# Patient Record
Sex: FEMALE | Race: WHITE | NOT HISPANIC OR LATINO | Employment: OTHER | URBAN - METROPOLITAN AREA
[De-identification: names, ages, dates, MRNs, and addresses within clinical notes are randomized per-mention and may not be internally consistent; named-entity substitution may affect disease eponyms.]

---

## 2023-05-27 ENCOUNTER — APPOINTMENT (OUTPATIENT)
Dept: CT IMAGING | Facility: HOSPITAL | Age: 83
DRG: 152 | End: 2023-05-27
Attending: EMERGENCY MEDICINE
Payer: MEDICARE

## 2023-05-27 ENCOUNTER — APPOINTMENT (OUTPATIENT)
Dept: MRI IMAGING | Facility: HOSPITAL | Age: 83
DRG: 152 | End: 2023-05-27
Attending: EMERGENCY MEDICINE
Payer: MEDICARE

## 2023-05-27 ENCOUNTER — APPOINTMENT (OUTPATIENT)
Dept: RADIOLOGY | Facility: HOSPITAL | Age: 83
DRG: 152 | End: 2023-05-27
Attending: EMERGENCY MEDICINE
Payer: MEDICARE

## 2023-05-27 ENCOUNTER — MEDICAL CORRESPONDENCE (OUTPATIENT)
Dept: HEALTH INFORMATION MANAGEMENT | Facility: CLINIC | Age: 83
End: 2023-05-27

## 2023-05-27 ENCOUNTER — HOSPITAL ENCOUNTER (INPATIENT)
Facility: HOSPITAL | Age: 83
LOS: 3 days | Discharge: NURSING, REHAB OR LONG-TERM CARE | DRG: 152 | End: 2023-05-30
Attending: EMERGENCY MEDICINE | Admitting: HOSPITALIST
Payer: MEDICARE

## 2023-05-27 DIAGNOSIS — I10 BENIGN ESSENTIAL HYPERTENSION: Primary | ICD-10-CM

## 2023-05-27 DIAGNOSIS — I25.10 ASCVD (ARTERIOSCLEROTIC CARDIOVASCULAR DISEASE): ICD-10-CM

## 2023-05-27 DIAGNOSIS — K59.00 CONSTIPATION, UNSPECIFIED CONSTIPATION TYPE: ICD-10-CM

## 2023-05-27 DIAGNOSIS — R65.10 SIRS (SYSTEMIC INFLAMMATORY RESPONSE SYNDROME) (H): ICD-10-CM

## 2023-05-27 DIAGNOSIS — R52 PAIN: ICD-10-CM

## 2023-05-27 DIAGNOSIS — J02.9 ACUTE PHARYNGITIS, UNSPECIFIED ETIOLOGY: ICD-10-CM

## 2023-05-27 DIAGNOSIS — R41.0 DELIRIUM: ICD-10-CM
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
## 2023-05-27 LAB
ABO/RH(D): NORMAL
ALBUMIN SERPL BCG-MCNC: 4.2 G/DL (ref 3.5–5.2)
ALBUMIN UR-MCNC: 30 MG/DL
ALP SERPL-CCNC: 79 U/L (ref 35–104)
ALT SERPL W P-5'-P-CCNC: 21 U/L (ref 10–35)
ANION GAP SERPL CALCULATED.3IONS-SCNC: 14 MMOL/L (ref 7–15)
ANTIBODY SCREEN: NEGATIVE
APPEARANCE UR: CLEAR
APTT PPP: 25 SECONDS (ref 22–38)
AST SERPL W P-5'-P-CCNC: 38 U/L (ref 10–35)
BASOPHILS # BLD AUTO: 0.1 10E3/UL (ref 0–0.2)
BASOPHILS NFR BLD AUTO: 0 %
BILIRUB SERPL-MCNC: 1.1 MG/DL
BILIRUB UR QL STRIP: NEGATIVE
BUN SERPL-MCNC: 22.1 MG/DL (ref 8–23)
CALCIUM SERPL-MCNC: 9.8 MG/DL (ref 8.8–10.2)
CHLORIDE SERPL-SCNC: 103 MMOL/L (ref 98–107)
COLOR UR AUTO: YELLOW
CREAT SERPL-MCNC: 0.71 MG/DL (ref 0.51–0.95)
DEPRECATED HCO3 PLAS-SCNC: 23 MMOL/L (ref 22–29)
EOSINOPHIL # BLD AUTO: 0 10E3/UL (ref 0–0.7)
EOSINOPHIL NFR BLD AUTO: 0 %
ERYTHROCYTE [DISTWIDTH] IN BLOOD BY AUTOMATED COUNT: 14.5 % (ref 10–15)
ETHANOL SERPL-MCNC: <0.01 G/DL
FLUAV RNA SPEC QL NAA+PROBE: NEGATIVE
FLUBV RNA RESP QL NAA+PROBE: NEGATIVE
GFR SERPL CREATININE-BSD FRML MDRD: 85 ML/MIN/1.73M2
GLUCOSE SERPL-MCNC: 156 MG/DL (ref 70–99)
GLUCOSE UR STRIP-MCNC: NEGATIVE MG/DL
HCT VFR BLD AUTO: 51.9 % (ref 35–47)
HGB BLD-MCNC: 17.6 G/DL (ref 11.7–15.7)
HGB UR QL STRIP: NEGATIVE
HYALINE CASTS: 26 /LPF
IMM GRANULOCYTES # BLD: 0.1 10E3/UL
IMM GRANULOCYTES NFR BLD: 0 %
INR PPP: 1.1 (ref 0.85–1.15)
KETONES UR STRIP-MCNC: 10 MG/DL
LACTATE SERPL-SCNC: 2.2 MMOL/L (ref 0.7–2)
LACTATE SERPL-SCNC: 2.3 MMOL/L (ref 0.7–2)
LEUKOCYTE ESTERASE UR QL STRIP: ABNORMAL
LIPASE SERPL-CCNC: 17 U/L (ref 13–60)
LYMPHOCYTES # BLD AUTO: 0.6 10E3/UL (ref 0.8–5.3)
LYMPHOCYTES NFR BLD AUTO: 3 %
MAGNESIUM SERPL-MCNC: 1.8 MG/DL (ref 1.7–2.3)
MCH RBC QN AUTO: 30.6 PG (ref 26.5–33)
MCHC RBC AUTO-ENTMCNC: 33.9 G/DL (ref 31.5–36.5)
MCV RBC AUTO: 90 FL (ref 78–100)
MONOCYTES # BLD AUTO: 0.7 10E3/UL (ref 0–1.3)
MONOCYTES NFR BLD AUTO: 4 %
MUCOUS THREADS #/AREA URNS LPF: PRESENT /LPF
NEUTROPHILS # BLD AUTO: 15.8 10E3/UL (ref 1.6–8.3)
NEUTROPHILS NFR BLD AUTO: 93 %
NITRATE UR QL: NEGATIVE
NRBC # BLD AUTO: 0 10E3/UL
NRBC BLD AUTO-RTO: 0 /100
PH UR STRIP: 5.5 [PH] (ref 5–7)
PLATELET # BLD AUTO: 215 10E3/UL (ref 150–450)
POTASSIUM SERPL-SCNC: 3.6 MMOL/L (ref 3.4–5.3)
PROT SERPL-MCNC: 7.1 G/DL (ref 6.4–8.3)
RBC # BLD AUTO: 5.75 10E6/UL (ref 3.8–5.2)
RBC URINE: 1 /HPF
RSV RNA SPEC NAA+PROBE: NEGATIVE
SARS-COV-2 RNA RESP QL NAA+PROBE: NEGATIVE
SODIUM SERPL-SCNC: 140 MMOL/L (ref 136–145)
SP GR UR STRIP: 1.03 (ref 1–1.03)
SPECIMEN EXPIRATION DATE: NORMAL
SQUAMOUS EPITHELIAL: <1 /HPF
TRANSITIONAL EPI: <1 /HPF
TROPONIN T SERPL HS-MCNC: 19 NG/L
TSH SERPL DL<=0.005 MIU/L-ACNC: 3.41 UIU/ML (ref 0.3–4.2)
UROBILINOGEN UR STRIP-MCNC: 2 MG/DL
WBC # BLD AUTO: 17.2 10E3/UL (ref 4–11)
WBC URINE: 4 /HPF

## 2023-05-27 PROCEDURE — 85730 THROMBOPLASTIN TIME PARTIAL: CPT | Performed by: EMERGENCY MEDICINE

## 2023-05-27 PROCEDURE — 99285 EMERGENCY DEPT VISIT HI MDM: CPT | Mod: 25

## 2023-05-27 PROCEDURE — 250N000011 HC RX IP 250 OP 636: Performed by: EMERGENCY MEDICINE

## 2023-05-27 PROCEDURE — 250N000013 HC RX MED GY IP 250 OP 250 PS 637: Performed by: HOSPITALIST

## 2023-05-27 PROCEDURE — 258N000003 HC RX IP 258 OP 636: Performed by: EMERGENCY MEDICINE

## 2023-05-27 PROCEDURE — 87637 SARSCOV2&INF A&B&RSV AMP PRB: CPT | Performed by: EMERGENCY MEDICINE

## 2023-05-27 PROCEDURE — 85610 PROTHROMBIN TIME: CPT | Performed by: EMERGENCY MEDICINE

## 2023-05-27 PROCEDURE — 86850 RBC ANTIBODY SCREEN: CPT | Performed by: EMERGENCY MEDICINE

## 2023-05-27 PROCEDURE — 99207 PR NO BILLABLE SERVICE THIS VISIT: CPT | Performed by: NURSE PRACTITIONER

## 2023-05-27 PROCEDURE — 71250 CT THORAX DX C-: CPT | Mod: MA

## 2023-05-27 PROCEDURE — 120N000001 HC R&B MED SURG/OB

## 2023-05-27 PROCEDURE — 258N000003 HC RX IP 258 OP 636: Performed by: HOSPITALIST

## 2023-05-27 PROCEDURE — 80053 COMPREHEN METABOLIC PANEL: CPT | Performed by: EMERGENCY MEDICINE

## 2023-05-27 PROCEDURE — 81001 URINALYSIS AUTO W/SCOPE: CPT | Performed by: EMERGENCY MEDICINE

## 2023-05-27 PROCEDURE — G1010 CDSM STANSON: HCPCS

## 2023-05-27 PROCEDURE — A9585 GADOBUTROL INJECTION: HCPCS | Performed by: EMERGENCY MEDICINE

## 2023-05-27 PROCEDURE — 70553 MRI BRAIN STEM W/O & W/DYE: CPT | Mod: MG

## 2023-05-27 PROCEDURE — 96361 HYDRATE IV INFUSION ADD-ON: CPT

## 2023-05-27 PROCEDURE — 83690 ASSAY OF LIPASE: CPT | Performed by: EMERGENCY MEDICINE

## 2023-05-27 PROCEDURE — 85014 HEMATOCRIT: CPT | Performed by: EMERGENCY MEDICINE

## 2023-05-27 PROCEDURE — 82077 ASSAY SPEC XCP UR&BREATH IA: CPT | Performed by: EMERGENCY MEDICINE

## 2023-05-27 PROCEDURE — 83735 ASSAY OF MAGNESIUM: CPT | Performed by: EMERGENCY MEDICINE

## 2023-05-27 PROCEDURE — 99222 1ST HOSP IP/OBS MODERATE 55: CPT | Performed by: HOSPITALIST

## 2023-05-27 PROCEDURE — 87040 BLOOD CULTURE FOR BACTERIA: CPT | Performed by: EMERGENCY MEDICINE

## 2023-05-27 PROCEDURE — 84484 ASSAY OF TROPONIN QUANT: CPT | Performed by: EMERGENCY MEDICINE

## 2023-05-27 PROCEDURE — 93005 ELECTROCARDIOGRAM TRACING: CPT | Performed by: EMERGENCY MEDICINE

## 2023-05-27 PROCEDURE — C9803 HOPD COVID-19 SPEC COLLECT: HCPCS

## 2023-05-27 PROCEDURE — 36415 COLL VENOUS BLD VENIPUNCTURE: CPT | Performed by: EMERGENCY MEDICINE

## 2023-05-27 PROCEDURE — 250N000011 HC RX IP 250 OP 636: Performed by: HOSPITALIST

## 2023-05-27 PROCEDURE — 70549 MR ANGIOGRAPH NECK W/O&W/DYE: CPT | Mod: MG

## 2023-05-27 PROCEDURE — 96374 THER/PROPH/DIAG INJ IV PUSH: CPT

## 2023-05-27 PROCEDURE — 72125 CT NECK SPINE W/O DYE: CPT | Mod: MA

## 2023-05-27 PROCEDURE — 83605 ASSAY OF LACTIC ACID: CPT | Performed by: EMERGENCY MEDICINE

## 2023-05-27 PROCEDURE — 73560 X-RAY EXAM OF KNEE 1 OR 2: CPT | Mod: 50

## 2023-05-27 PROCEDURE — 84443 ASSAY THYROID STIM HORMONE: CPT | Performed by: EMERGENCY MEDICINE

## 2023-05-27 PROCEDURE — 70450 CT HEAD/BRAIN W/O DYE: CPT | Mod: MA

## 2023-05-27 PROCEDURE — 255N000002 HC RX 255 OP 636: Performed by: EMERGENCY MEDICINE

## 2023-05-27 RX ORDER — ONDANSETRON 4 MG/1
4 TABLET, ORALLY DISINTEGRATING ORAL EVERY 6 HOURS PRN
Status: DISCONTINUED | OUTPATIENT
Start: 2023-05-27 | End: 2023-05-30 | Stop reason: HOSPADM

## 2023-05-27 RX ORDER — LIDOCAINE 40 MG/G
CREAM TOPICAL
Status: DISCONTINUED | OUTPATIENT
Start: 2023-05-27 | End: 2023-05-30 | Stop reason: HOSPADM

## 2023-05-27 RX ORDER — ENOXAPARIN SODIUM 100 MG/ML
40 INJECTION SUBCUTANEOUS EVERY EVENING
Status: DISCONTINUED | OUTPATIENT
Start: 2023-05-27 | End: 2023-05-30 | Stop reason: HOSPADM

## 2023-05-27 RX ORDER — LANOLIN ALCOHOL/MO/W.PET/CERES
3 CREAM (GRAM) TOPICAL
Status: DISCONTINUED | OUTPATIENT
Start: 2023-05-27 | End: 2023-05-30 | Stop reason: HOSPADM

## 2023-05-27 RX ORDER — SENNOSIDES 8.6 MG
1 TABLET ORAL 2 TIMES DAILY
Status: DISCONTINUED | OUTPATIENT
Start: 2023-05-27 | End: 2023-05-30 | Stop reason: HOSPADM

## 2023-05-27 RX ORDER — HYDRALAZINE HYDROCHLORIDE 20 MG/ML
10 INJECTION INTRAMUSCULAR; INTRAVENOUS EVERY 4 HOURS PRN
Status: DISCONTINUED | OUTPATIENT
Start: 2023-05-27 | End: 2023-05-30 | Stop reason: HOSPADM

## 2023-05-27 RX ORDER — LOSARTAN POTASSIUM 50 MG/1
50 TABLET ORAL AT BEDTIME
Status: DISCONTINUED | OUTPATIENT
Start: 2023-05-27 | End: 2023-05-28

## 2023-05-27 RX ORDER — MULTIPLE VITAMINS W/ MINERALS TAB 9MG-400MCG
1 TAB ORAL DAILY
COMMUNITY

## 2023-05-27 RX ORDER — AMOXICILLIN 250 MG
1 CAPSULE ORAL 2 TIMES DAILY PRN
Status: DISCONTINUED | OUTPATIENT
Start: 2023-05-27 | End: 2023-05-30 | Stop reason: HOSPADM

## 2023-05-27 RX ORDER — SENNOSIDES 8.6 MG
1 TABLET ORAL 2 TIMES DAILY
Status: ON HOLD | COMMUNITY
End: 2023-05-30

## 2023-05-27 RX ORDER — GADOBUTROL 604.72 MG/ML
7 INJECTION INTRAVENOUS ONCE
Status: COMPLETED | OUTPATIENT
Start: 2023-05-27 | End: 2023-05-27

## 2023-05-27 RX ORDER — NAPROXEN SODIUM 220 MG
220 TABLET ORAL 2 TIMES DAILY WITH MEALS
Status: ON HOLD | COMMUNITY
End: 2023-05-30

## 2023-05-27 RX ORDER — LOSARTAN POTASSIUM 50 MG/1
50 TABLET ORAL DAILY
Status: ON HOLD | COMMUNITY
End: 2023-05-30

## 2023-05-27 RX ORDER — SODIUM CHLORIDE 9 MG/ML
INJECTION, SOLUTION INTRAVENOUS CONTINUOUS
Status: DISCONTINUED | OUTPATIENT
Start: 2023-05-27 | End: 2023-05-29

## 2023-05-27 RX ORDER — ONDANSETRON 2 MG/ML
4 INJECTION INTRAMUSCULAR; INTRAVENOUS ONCE
Status: COMPLETED | OUTPATIENT
Start: 2023-05-27 | End: 2023-05-27

## 2023-05-27 RX ORDER — ONDANSETRON 2 MG/ML
4 INJECTION INTRAMUSCULAR; INTRAVENOUS EVERY 6 HOURS PRN
Status: DISCONTINUED | OUTPATIENT
Start: 2023-05-27 | End: 2023-05-30 | Stop reason: HOSPADM

## 2023-05-27 RX ORDER — AMOXICILLIN 250 MG
2 CAPSULE ORAL 2 TIMES DAILY PRN
Status: DISCONTINUED | OUTPATIENT
Start: 2023-05-27 | End: 2023-05-30 | Stop reason: HOSPADM

## 2023-05-27 RX ADMIN — GADOBUTROL 7 ML: 604.72 INJECTION INTRAVENOUS at 17:43

## 2023-05-27 RX ADMIN — SENNOSIDES 1 TABLET: 8.6 TABLET, FILM COATED ORAL at 20:27

## 2023-05-27 RX ADMIN — Medication 3 MG: at 20:26

## 2023-05-27 RX ADMIN — SODIUM CHLORIDE 1000 ML: 9 INJECTION, SOLUTION INTRAVENOUS at 14:11

## 2023-05-27 RX ADMIN — ONDANSETRON 4 MG: 2 INJECTION INTRAMUSCULAR; INTRAVENOUS at 14:20

## 2023-05-27 RX ADMIN — ENOXAPARIN SODIUM 40 MG: 40 INJECTION SUBCUTANEOUS at 20:26

## 2023-05-27 RX ADMIN — SERTRALINE HYDROCHLORIDE 50 MG: 50 TABLET ORAL at 20:27

## 2023-05-27 RX ADMIN — LOSARTAN POTASSIUM 50 MG: 50 TABLET, FILM COATED ORAL at 20:27

## 2023-05-27 RX ADMIN — SODIUM CHLORIDE: 9 INJECTION, SOLUTION INTRAVENOUS at 18:14

## 2023-05-27 ASSESSMENT — ACTIVITIES OF DAILY LIVING (ADL)
FALL_HISTORY_WITHIN_LAST_SIX_MONTHS: YES
WALKING_OR_CLIMBING_STAIRS: AMBULATION DIFFICULTY, DEPENDENT;STAIR CLIMBING DIFFICULTY, DEPENDENT;TRANSFERRING DIFFICULTY, DEPENDENT
TOILETING: 2-->COMPLETELY DEPENDENT
DOING_ERRANDS_INDEPENDENTLY_DIFFICULTY: YES
ADLS_ACUITY_SCORE: 57
TRANSFERRING: 2-->COMPLETELY DEPENDENT (NOT DEVELOPMENTALLY APPROPRIATE)
TRANSFERRING: 2-->COMPLETELY DEPENDENT
TOILETING_ASSISTANCE: TOILETING DIFFICULTY, DEPENDENT
CHANGE_IN_FUNCTIONAL_STATUS_SINCE_ONSET_OF_CURRENT_ILLNESS/INJURY: YES
ADLS_ACUITY_SCORE: 35
ADLS_ACUITY_SCORE: 37
DRESSING/BATHING_DIFFICULTY: YES
ADLS_ACUITY_SCORE: 35
EQUIPMENT_CURRENTLY_USED_AT_HOME: OTHER (SEE COMMENTS)
DRESS: 2-->COMPLETELY DEPENDENT (NOT DEVELOPMENTALLY APPROPRIATE)
CONCENTRATING,_REMEMBERING_OR_MAKING_DECISIONS_DIFFICULTY: YES
WEAR_GLASSES_OR_BLIND: OTHER (SEE COMMENTS)
TOILETING_ISSUES: YES
BATHING: 2-->COMPLETELY DEPENDENT (NOT DEVELOPMENTALLY APPROPRIATE)
ADLS_ACUITY_SCORE: 57
TOILETING: 2-->COMPLETELY DEPENDENT (NOT DEVELOPMENTALLY APPROPRIATE)
DRESSING/BATHING: BATHING DIFFICULTY, DEPENDENT;DRESSING DIFFICULTY, DEPENDENT
ADLS_ACUITY_SCORE: 35
DIFFICULTY_EATING/SWALLOWING: NO
DRESS: 2-->COMPLETELY DEPENDENT
NUMBER_OF_TIMES_PATIENT_HAS_FALLEN_WITHIN_LAST_SIX_MONTHS: 1
WALKING_OR_CLIMBING_STAIRS_DIFFICULTY: YES

## 2023-05-27 NOTE — H&P
[facility]    History and Physical - Hospitalist Service       Date of Admission:  5/27/2023    Assessment & Plan      Molly Limon is a 82 year old female admitted on 5/27/2023. She has PMH of dementia, hypertension who presented form assisted living with b/l leg weakness.  Labs noted to have tachycardia with hypoxia and tachypnea, mild leukocytosis, lactic acidosis suggestive of SIRS with no primary source of infection.  UA not suggestive of UTI.  CT chest abdomen pelvis no source of infection.  CT head and CT C-spine unremarkable.  MRI limited but neg for stroke.     SIRS  -Patient has a history of dementia and not  reliable historian.  -Labs reveal leukocytosis, tachycardia and tachypnea on arrival with mild lactic acidosis. Pt gets agitated during vitals check.  -CT chest no evidence of any pneumonia.  UA negative for UTI.  COVID, influenza AMB, RSV negative  -Monitor for now off antibiotics as unclear if patient has true underlying infection    Lactic acidosis -  -Hydrate, recheck  -Monitor off abx.      Bilateral lower extremity weakness  -Per EMS report  CT head and CT C-spine without any acute process.  -MRI/MRA head and neck limited due to motion, but negative for any ischemia.  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  -Place on fall precaution  -PT/OT consulted    Hypertension-  -Resume PTA losartan  -Hydralazine as needed    Mood disorders-stable  -Continue PTA Zoloft    Dementia   -Pt likely will benefit placement in memory care.  -Place on 1:1 for safety.       Diet: Combination Diet Regular Diet Adult  DVT Prophylaxis: Enoxaparin (Lovenox) SQ  Ordoñez Catheter: Not present  Lines: None     Cardiac Monitoring: None  Code Status: No CPR- Do NOT Intubate, per her advance directive.    Clinically Significant Risk Factors Present on Admission                  # Hypertension: Home medication list includes antihypertensive(s)               Disposition Plan      Expected Discharge  Date: 05/29/2023                  Siobhan Johnson MD  Hospitalist Service  [facility]  Securely message with [employer] (more info)  Text page via [facility] Paging/Directory     ______________________________________________________________________    Chief Complaint   Weakness    History is obtained from the  ED attending and chart review    History of Present Illness   Molly Limon is a 82 year old female with past medical history of hypertension, dementia who presented today from assisted living with bilateral lower extremity weakness.  Per EMS report patient has been having lower extremity weakness since yesterday.  Reports that she fell off today and was found by the staff during which patient was also noted to have some slight facial droop. Denies any chest pain, shortness of breath, abdominal pain, nausea, vomiting, cough, diarrhea,  dysuria or polyuria.  Constipation+.   No tingling or numbness.     Per EMS, patient was noted to be hypertensive with systolic in 190s when they arrived.  On arrival to ED patient was still hypertensive.  Labs revealed mild leukocytosis, lactic acidosis, mildly elevated troponins.  UA not suggestive of UTI.  COVID, influenza A, influenza B and RSV negative.  CT head, CT C-spine, CT chest abdomen pelvis are all unremarkable.  Given there could be concerns for stroke, this was discussed with on-call stroke neurologist who recommended MRI.  MRI pneg for ischemia.        Past Medical History    Dementia    Past Surgical History   History reviewed. No pertinent surgical history.    Prior to Admission Medications   Prior to Admission Medications   Prescriptions Last Dose Informant Patient Reported? Taking?   losartan (COZAAR) 50 MG tablet 5/26/2023 at qhs  Yes Yes   Sig: Take 50 mg by mouth daily   melatonin 5 MG tablet 5/26/2023 at qhs  Yes Yes   Sig: Take 5 mg by mouth At Bedtime   multivitamin w/minerals (THERA-VIT-M) tablet 5/27/2023 at am  Yes Yes   Sig: Take 1  tablet by mouth daily   naproxen sodium (ANAPROX) 220 MG tablet 5/27/2023 at 10am  Yes Yes   Sig: Take 220 mg by mouth 2 times daily (with meals)   sennosides (SENOKOT) 8.6 MG tablet 5/27/2023 at 10am  Yes Yes   Sig: Take 1 tablet by mouth 2 times daily   sertraline (ZOLOFT) 50 MG tablet 5/26/2023 at 8pm  Yes Yes   Sig: Take 50 mg by mouth daily      Facility-Administered Medications: None        Review of Systems    The 10 point Review of Systems is negative other than noted in the HPI or here.      Physical Exam   Vital Signs: Temp: 98  F (36.7  C) Temp src: Oral BP: (!) 211/98 Pulse: 100   Resp: 22 SpO2: 94 % O2 Device: None (Room air) Oxygen Delivery: 2 LPM  Weight: 172 lbs 13.45 oz    General: Elderly female, pleasant, NAD  HEENT:EOMI, AT,NC  CVS:RRR, no edema  RS:CTAB  Abd: Soft, NT,ND  Neurology:Awake, alert, oriented to self only, memory issues+  Psy:Approrpiate affect      Medical Decision Making       60 MINUTES SPENT BY ME on the date of service doing chart review, history, exam, documentation & further activities per the note.        Plan discussed with her niece , Neelima.  Data     I have personally reviewed the following data over the past 24 hrs:    17.2 (H)  \   17.6 (H)   / 215     140 103 22.1 /  156 (H)   3.6 23 0.71 \       ALT: 21 AST: 38 (H) AP: 79 TBILI: 1.1   ALB: 4.2 TOT PROTEIN: 7.1 LIPASE: 17       Trop: 19 (H) BNP: N/A       TSH: 3.41 T4: N/A A1C: N/A       Procal: N/A CRP: N/A Lactic Acid: 2.3 (H)       INR:  1.10 PTT:  25   D-dimer:  N/A Fibrinogen:  N/A

## 2023-05-27 NOTE — ED NOTES
[facility] ED Handoff Report    ED Chief Complaint: found down    ED Diagnosis:  (W19.XXXA) Fall, initial encounter  Comment:   Plan: neuro/obs    (R41.0) Delirium  Comment:   Plan: watch pt    (R65.10) SIRS (systemic inflammatory response syndrome) (H)  Comment:   Plan: meds, fluids       PMH:  History reviewed. No pertinent past medical history.     Code Status:  No Order     Falls Risk: Yes Band: Applied    Current Living Situation/Residence: lives in an extended care facility     Elimination Status: Continent: No     Activity Level: 2 assist    Patients Preferred Language:  English     Needed: No    Vital Signs:  BP (!) 189/92   Pulse 101   Resp 26   SpO2 94%      Cardiac Rhythm: nsr    Pain Score: 1/10    Is the Patient Confused:  Yes    Last Food or Drink: 05/27/23 at     Focused Assessment:      Tests Performed: Done: Labs and Imaging    Treatments Provided:  meds, fluids    Family Dynamics/Concerns: No    Family Updated On Visitor Policy: Yes    Plan of Care Communicated to Family: No    Who Was Updated about Plan of Care: facility    Belongings Checklist Done and Signed by Patient: No    Medications sent with patient:     Covid: asymptomatic     Additional Information:     RN: Monalisa Almeida RN   5/27/2023 4:24 PM

## 2023-05-27 NOTE — ED NOTES
Bed: JNEDH-J  Expected date:   Expected time:   Means of arrival:   Comments:  Stanislav Ritter y F Stroke symptoms

## 2023-05-27 NOTE — PHARMACY-ADMISSION MEDICATION HISTORY
Pharmacist Admission Medication History    Admission medication history is complete. The information provided in this note is only as accurate as the sources available at the time of the update.    Medication reconciliation/reorder completed by provider prior to medication history? No    Information Source(s): Patient and CareEverywhere/SureScripts via in-person    Pertinent Information: Patient with no history in chart. Was able to update med history using med list from facility.    Changes made to PTA medication list:    Added: all    Deleted: None    Changed: None    Medication Affordability:  No concerns    Allergies reviewed with patient and updates made in EHR: unable to assess    Medication History Completed By: DEIDRE PRABHAKAR [facility] 5/27/2023 2:34 PM    Prior to Admission medications    Medication Sig Last Dose Taking? Auth Provider Long Term End Date   losartan (COZAAR) 50 MG tablet Take 50 mg by mouth daily 5/26/2023 at [facility] Yes Unknown, Entered By History Yes    melatonin 5 MG tablet Take 5 mg by mouth At Bedtime 5/26/2023 at qhs Yes Unknown, Entered By History     multivitamin w/minerals (THERA-VIT-M) tablet Take 1 tablet by mouth daily 5/27/2023 at am Yes Unknown, Entered By History     naproxen sodium (ANAPROX) 220 MG tablet Take 220 mg by mouth 2 times daily (with meals) 5/27/2023 at 10am Yes Unknown, Entered By History     sennosides (SENOKOT) 8.6 MG tablet Take 1 tablet by mouth 2 times daily 5/27/2023 at 10am Yes Unknown, Entered By History     sertraline (ZOLOFT) 50 MG tablet Take 50 mg by mouth daily 5/26/2023 at 8pm Yes Unknown, Entered By History Yes

## 2023-05-27 NOTE — ED NOTES
Pt stood with a 2 person assist to walk to the [facility] for transport to her room 126

## 2023-05-27 NOTE — CONSULTS
"  [facility]    Stroke Telephone Note    I was called by Minda Anthony on 05/27/23 regarding patient Molly Limon. The patient is a 82 year old female with unknown PMHx aside from dementia (records unavailable). She was brought in by EMS from her assisted living facility. She was noted to have left leg weakness some time yesterday (time unclear) and she was reportedly feeling off. Today, she was found down. Imaging has thus far been unremarkable. Toxic/metabolic/infectious work up in process.     Imaging Findings   HEAD CT:  1.  No CT evidence for acute intracranial process.  2.  Brain atrophy and presumed chronic microvascular ischemic changes as above.     CERVICAL SPINE CT:  1.  No CT evidence for acute fracture or post traumatic subluxation.    CT C/A/P:  1.  No evidence of acute trauma in the chest, abdomen or pelvis.  2.  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    Impression  Left leg weakness, altered mental status (found down): Ddx includes stroke, seizure, infection     Recommendations   - agree with MRI/MRA brain   - toxic/metabolic/infectious work up in process   - can allow for permissive hypertension while awaiting MRI; treat SBP > 220  - regardless of MRI results, please place IP gen neuro consult     My recommendations are based on the information provided over the phone by Molly Limon's in-person providers. They are not intended to replace the clinical judgment of her in-person providers. I was not requested to personally see or examine the patient at this time.    Anna Marie Garcia NP  Vascular Neurology    To page me or covering stroke neurology team member, click here: [facility]  Choose \"On Call\" tab at top, then select \"NEUROLOGY/ALL SITES\" from middle drop-down box, press Enter, then look for \"stroke\" or \"telestroke\" for your site.      "

## 2023-05-27 NOTE — ED NOTES
Per Dr. Anthony, patient needs imaging expedited prior to other interventions at this time.  This writer brought patient to CT and x-ray monitored- no significant events.

## 2023-05-27 NOTE — ED PROVIDER NOTES
"EMERGENCY DEPARTMENT ENCOUNTER      NAME: Molly Limon  AGE: 82 year old female  YOB: 1940  MRN: [id number]  EVALUATION DATE & TIME: 5/27/2023 12:49 PM    PCP: No primary care provider on file.    ED PROVIDER: Minda Anthony M.D.      Chief Complaint   Patient presents with     Stroke Symptoms         FINAL IMPRESSION:  1. Fall, initial encounter    2. Delirium    3. SIRS (systemic inflammatory response syndrome) (H)          ED COURSE & MEDICAL DECISION MAKING:    ED Course as of 05/27/23 1502   Sat May 27, 2023   1251  I met the patient and performed my initial interview and exam.      1303 Patient with no PMHx listed through our system BIB EMS from assisted living with 2 days left leg \"weak\" and found on ground today with dry cracked lips, , FSG 160s per EMS, no known blood thinners, c-collar on but no pain to neck midline when palpated, with epigastric and RUQ pain on examination and pain to both knees without abrasions or effusion to either knee when examined, CT head/c-spine/CAP pending and I spoke with her RN and charge RN Becky and patient stat to noncontrast CT imaging, then screening TSH wtih EtOH, CBC/CMP, cultures/UA/lactic acid, COVID19 PCR to assess for toxic/metabolic pathology or organic pathology to account for AMS/delirium, stat imagin to ensure no acute traumatic pathology or intracranial hemorrhage which would of course necessitate rapid BP lowering, permissive HTN allowed at this time in case of ischemic stroke with NIHSS 5 but deficits since at least yesterday per report to EMS and leg weakness could certainly relate to her fall today, stat to imaging and on monitor at this time, EKG pending   1346 CT head reviewed by me without large intracranial hemorrhage/fluid collection present, awaiting formal radiology review of imaging   1351 XR bilateral knees without acute traumatic bony abnormalities or effusion, degenerative arthritis apparent   1351 RN and tech bringing " patient to room now for straight cath urine sample as patient with dementia and wearing incontinence diaper and unable to express to us reliably when she is able to pass urine   1359 Spoke with Stroke neurology regarding patient plan of care   1401 I spoke with Anna Marie from stroke neuro who recommends keep BP elevated/permissive HTN and only treat if over 220 until MRI, if no stroke on MRI then ok to treat BP more aggressively and nromally, if MRI reveals stroke then discuss with stroke neurology prior to decreasing BP, she is happy to follow case   1406 WBC 17.2 and with  and WBC 17.2, and delirium of unknown etiology, but no source yet present, SIRS is appreciated. Awaiting UA which is being sent to lab now to determine if UTI is etiology of SIRS and possibly also delirium while also ruling out stroke via MRI prior to decreasing BP   1412 I called lab who did receive urine sample and will run UA now   1412 CT head and C-spine without hemorrhage, clear stroke, or bony neck injury, c-collar removed   1446 Pt endorsed to hospitalist to neuro tele   1500 UA without cells to suggest UTI       Pertinent Labs & Imaging studies reviewed. (See chart for details)    N95 worn  A face shield was worn also  COVID PPE    Medical Decision Making    History:    Supplemental history from: Documented in chart, if applicable    External Record(s) reviewed: Documented in chart, if applicable.    Work Up:    Chart documentation includes differential considered and any EKGs or imaging independently interpreted by provider, where specified.    In additional to work up documented, I considered the following work up: Documented in chart, if applicable.    External consultation:    Discussion of management with another provider: Documented in chart, if applicable    Complicating factors:    Care impacted by chronic illness: Dementia    Care affected by social determinants of health: N/A    Disposition considerations: Admit.    National  "[employer] Stroke Scale  Exam Interval: Baseline   Score    Level of consciousness: (0)   Alert, keenly responsive    LOC questions: (0)   Answers both questions correctly    LOC commands: (0)   Performs both tasks correctly    Best gaze: (0)   Normal    Visual: (0)   No visual loss    Facial palsy: (1)   Minor paralysis (flat nasolabial fold, smile asymmetry)    Motor arm (left): (0)   No drift    Motor arm (right): (0)   No drift    Motor leg (left): (2)   Some effort against gravity    Motor leg (right): (1)   Drift    Limb ataxia: (0)   Absent    Sensory: (0)   Normal- no sensory loss    Best language: (1)   Mild to moderate aphasia    Dysarthria: (0)   Normal    Extinction and inattention: (0)   No abnormality        Total Score:  5        At the conclusion of the encounter I discussed the results of all of the tests and the disposition. The questions were answered. The patient or family acknowledged understanding and was agreeable with the care plan.     MEDICATIONS GIVEN IN THE EMERGENCY:  Medications   0.9% sodium chloride BOLUS (1,000 mLs Intravenous $New Bag 5/27/23 1411)   ondansetron (ZOFRAN) injection 4 mg (4 mg Intravenous $Given 5/27/23 1420)       NEW PRESCRIPTIONS STARTED AT TODAY'S ER VISIT  New Prescriptions    No medications on file          =================================================================    HPI - Limited due to dementia      Molly Limon is a 82 year old female with PMHx of dementia who presents to the ED today via EMS with leg weakness    Patient comes from assisted living today. Yesterday at an unknown time, the patient developed lower leg weakness and \"felt funny.\" Today, the patient fell because she \"felt off.\" Staff noticed the patient with a slight facial droop. Patient also endorses chest pain which she localized to her epigastrium with associated nausea. No vomiting. EMS reports patient was hypertensive at 190s systolic. No other complaints at this time. "     The patient does not take a blood thinner.     REVIEW OF SYSTEMS   All other systems reviewed and are negative except as noted above in HPI.    PAST MEDICAL HISTORY:  History reviewed. No pertinent past medical history.    PAST SURGICAL HISTORY:  History reviewed. No pertinent surgical history.    CURRENT MEDICATIONS:    losartan (COZAAR) 50 MG tablet  melatonin 5 MG tablet  multivitamin w/minerals (THERA-VIT-M) tablet  naproxen sodium (ANAPROX) 220 MG tablet  sennosides (SENOKOT) 8.6 MG tablet  sertraline (ZOLOFT) 50 MG tablet        ALLERGIES:  Allergies   Allergen Reactions     Amlodipine Unknown     Atorvastatin Unknown     Crestor [Rosuvastatin] Unknown     Ezetimibe-Simvastatin Unknown     Gabapentin Unknown       FAMILY HISTORY:  History reviewed. No pertinent family history.    SOCIAL HISTORY:        VITALS:  Patient Vitals for the past 24 hrs:   BP Pulse Resp SpO2   05/27/23 1258 (!) 214/93 106 18 94 %       PHYSICAL EXAM    PHYSICAL EXAM    VITAL SIGNS: BP (!) 214/93   Pulse 106   Resp 18   SpO2 94%    GENERAL: Awake, alert.  In no acute distress. GCS 15  HEENT: Normocephalic, atraumatic.  Pupils equal, round and reactive.  Conjunctiva normal.  EOMI. No arango sign, no racoon eyes, no mastoid tenderness, no hemotympanum, no facial instability, no nasal bridge pain, no nasal septal hematoma, no intraoral lacerations, no loose teeth, no mandible pain or deformity  NECK: No stridor or apparent deformity. No midline pain to palpation. C-collar in place. No neck pain  PULMONARY: Symmetrical breath sounds without distress.  Lungs clear to auscultation bilaterally without wheezes, rhonchi or rales.  CARDIO: Rapid rate and regular rhythm.  No significant murmur, rub or gallop.  Radial pulses strong and symmetrical.  THORAX: No focal chest wall deformity or crepitus  BACK: No focal tenderness or deformity to each vertebral level in midline  ABDOMINAL: Abdomen soft, non-distended.  No CVAT, BL. RUQ and  epigastric tenderness. Old abdominal surgical scars, well appearing  EXTREMITIES: No lower extremity swelling or edema. Right left drift. Left leg drifts to bed. Right and left knee tenderness. No skin changes.   NEURO: Alert and oriented to person, place and time.  Cranial nerves grossly intact.  Left lower facial slight droop. Speech slurred.   PSYCH: Normal mood and affect  SKIN: No rashes. No  rash      LAB:  All pertinent labs reviewed and interpreted.  Results for orders placed or performed during the hospital encounter of 05/27/23   CT Chest Abdomen Pelvis w/o Contrast    Impression    IMPRESSION:  1.  No evidence of acute trauma in the chest, abdomen or pelvis.  2.  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   XR Knee Bilateral 1/2 Views    Impression    IMPRESSION:   RIGHT KNEE: Moderate medial and mild patellofemoral compartment degenerative arthritis. No acute fracture. No effusion.    LEFT KNEE: Mild medial and patellofemoral compartment degenerative arthritis. No acute fracture. No effusion.   CT Head w/o Contrast    Impression    IMPRESSION:  HEAD CT:  1.  No CT evidence for acute intracranial process.  2.  Brain atrophy and presumed chronic microvascular ischemic changes as above.    CERVICAL SPINE CT:  1.  No CT evidence for acute fracture or post traumatic subluxation.   CT Cervical Spine w/o Contrast    Impression    IMPRESSION:  HEAD CT:  1.  No CT evidence for acute intracranial process.  2.  Brain atrophy and presumed chronic microvascular ischemic changes as above.    CERVICAL SPINE CT:  1.  No CT evidence for acute fracture or post traumatic subluxation.   Result Value Ref Range    INR 1.10 0.85 - 1.15   Partial thromboplastin time   Result Value Ref Range    aPTT 25 22 - 38 Seconds   Comprehensive metabolic panel   Result Value Ref Range    Sodium 140 136 - 145 mmol/L    Potassium 3.6 3.4 - 5.3 mmol/L    Chloride 103 98 - 107 mmol/L    Carbon Dioxide (CO2)  23 22 - 29 mmol/L    Anion Gap 14 7 - 15 mmol/L    Urea Nitrogen 22.1 8.0 - 23.0 mg/dL    Creatinine 0.71 0.51 - 0.95 mg/dL    Calcium 9.8 8.8 - 10.2 mg/dL    Glucose 156 (H) 70 - 99 mg/dL    Alkaline Phosphatase 79 35 - 104 U/L    AST 38 (H) 10 - 35 U/L    ALT 21 10 - 35 U/L    Protein Total 7.1 6.4 - 8.3 g/dL    Albumin 4.2 3.5 - 5.2 g/dL    Bilirubin Total 1.1 <=1.2 mg/dL    GFR Estimate 85 >60 mL/min/1.73m2   Result Value Ref Range    Lipase 17 13 - 60 U/L   Result Value Ref Range    Troponin T, High Sensitivity 19 (H) <=14 ng/L   Result Value Ref Range    Magnesium 1.8 1.7 - 2.3 mg/dL   Ethyl Alcohol Level   Result Value Ref Range    Alcohol ethyl <0.01 <=0.01 g/dL   UA with Microscopic reflex to Culture    Specimen: Urine, Catheter   Result Value Ref Range    Color Urine Yellow Colorless, Straw, Light Yellow, Yellow    Appearance Urine Clear Clear    Glucose Urine Negative Negative mg/dL    Bilirubin Urine Negative Negative    Ketones Urine 10 (A) Negative mg/dL    Specific Gravity Urine 1.029 1.001 - 1.030    Blood Urine Negative Negative    pH Urine 5.5 5.0 - 7.0    Protein Albumin Urine 30 (A) Negative mg/dL    Urobilinogen Urine 2.0 (A) <2.0 mg/dL    Nitrite Urine Negative Negative    Leukocyte Esterase Urine 25 [place]/uL (A) Negative    Mucus Urine Present (A) None Seen /LPF    RBC Urine 1 <=2 /HPF    WBC Urine 4 <=5 /HPF    Squamous Epithelials Urine <1 <=1 /HPF    Transitional Epithelials Urine <1 <=1 /HPF    Hyaline Casts Urine 26 (H) <=2 /LPF   Symptomatic Influenza A/B, RSV, & SARS-CoV2 PCR (COVID-19) Nasopharyngeal    Specimen: Nasopharyngeal; Swab   Result Value Ref Range    Influenza A PCR Negative Negative    Influenza B PCR Negative Negative    RSV PCR Negative Negative    SARS CoV2 PCR Negative Negative   TSH with free T4 reflex   Result Value Ref Range    TSH 3.41 0.30 - 4.20 uIU/mL   Lactic acid whole blood   Result Value Ref Range    Lactic Acid 2.2 (H) 0.7 - 2.0 mmol/L   CBC with platelets and  differential   Result Value Ref Range    WBC Count 17.2 (H) 4.0 - 11.0 10e3/uL    RBC Count 5.75 (H) 3.80 - 5.20 10e6/uL    Hemoglobin 17.6 (H) 11.7 - 15.7 g/dL    Hematocrit 51.9 (H) 35.0 - 47.0 %    MCV 90 78 - 100 fL    MCH 30.6 26.5 - 33.0 pg    MCHC 33.9 31.5 - 36.5 g/dL    RDW 14.5 10.0 - 15.0 %    Platelet Count 215 150 - 450 10e3/uL    % Neutrophils 93 %    % Lymphocytes 3 %    % Monocytes 4 %    % Eosinophils 0 %    % Basophils 0 %    % Immature Granulocytes 0 %    NRBCs per 100 WBC 0 <1 /100    Absolute Neutrophils 15.8 (H) 1.6 - 8.3 10e3/uL    Absolute Lymphocytes 0.6 (L) 0.8 - 5.3 10e3/uL    Absolute Monocytes 0.7 0.0 - 1.3 10e3/uL    Absolute Eosinophils 0.0 0.0 - 0.7 10e3/uL    Absolute Basophils 0.1 0.0 - 0.2 10e3/uL    Absolute Immature Granulocytes 0.1 <=0.4 10e3/uL    Absolute NRBCs 0.0 10e3/uL   Adult Type and Screen   Result Value Ref Range    ABO/RH(D) B POS     Antibody Screen Negative Negative    SPECIMEN EXPIRATION DATE [id number]        RADIOLOGY:  Reviewed all pertinent imaging. Please see official radiology report.  CT Chest Abdomen Pelvis w/o Contrast   Final Result   IMPRESSION:   1.  No evidence of acute trauma in the chest, abdomen or pelvis.   2.  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      CT Cervical Spine w/o Contrast   Final Result   IMPRESSION:   HEAD CT:   1.  No CT evidence for acute intracranial process.   2.  Brain atrophy and presumed chronic microvascular ischemic changes as above.      CERVICAL SPINE CT:   1.  No CT evidence for acute fracture or post traumatic subluxation.      CT Head w/o Contrast   Final Result   IMPRESSION:   HEAD CT:   1.  No CT evidence for acute intracranial process.   2.  Brain atrophy and presumed chronic microvascular ischemic changes as above.      CERVICAL SPINE CT:   1.  No CT evidence for acute fracture or post traumatic subluxation.      XR Knee Bilateral 1/2 Views   Final Result   IMPRESSION:     RIGHT KNEE: Moderate medial and mild patellofemoral compartment degenerative arthritis. No acute fracture. No effusion.      LEFT KNEE: Mild medial and patellofemoral compartment degenerative arthritis. No acute fracture. No effusion.      MRA Brain ([place]) w Contrast    (Results Pending)   MRA Neck (Carotids) w Contrast    (Results Pending)   MR Brain w/o Contrast    (Results Pending)         EKG:    Reviewed and interpreted as: 27-May-23:13:46pm 104bpm, Sinus tachycardia, no prior for comparison       I have independently reviewed and interpreted the EKG(s) documented above.        I, Amanda Kerr, am serving as a scribe to document services personally performed by Dr. Minda Anthony based on my observation and the provider's statements to me. I, Minda Anthony MD attest that Amanda Kerr is acting in a scribe capacity, has observed my performance of the services and has documented them in accordance with my direction.     Minda Anthony MD  05/27/23 [id number]

## 2023-05-28 ENCOUNTER — APPOINTMENT (OUTPATIENT)
Dept: RADIOLOGY | Facility: HOSPITAL | Age: 83
DRG: 152 | End: 2023-05-28
Attending: HOSPITALIST
Payer: MEDICARE

## 2023-05-28 LAB
ALBUMIN SERPL BCG-MCNC: 3.3 G/DL (ref 3.5–5.2)
ALP SERPL-CCNC: 59 U/L (ref 35–104)
ALT SERPL W P-5'-P-CCNC: 18 U/L (ref 10–35)
ANION GAP SERPL CALCULATED.3IONS-SCNC: 10 MMOL/L (ref 7–15)
AST SERPL W P-5'-P-CCNC: 24 U/L (ref 10–35)
ATRIAL RATE - MUSE: 104 BPM
BILIRUB SERPL-MCNC: 0.8 MG/DL
BUN SERPL-MCNC: 20.4 MG/DL (ref 8–23)
CALCIUM SERPL-MCNC: 8.9 MG/DL (ref 8.8–10.2)
CHLORIDE SERPL-SCNC: 108 MMOL/L (ref 98–107)
CREAT SERPL-MCNC: 0.74 MG/DL (ref 0.51–0.95)
DEPRECATED HCO3 PLAS-SCNC: 23 MMOL/L (ref 22–29)
DIASTOLIC BLOOD PRESSURE - MUSE: 91 MMHG
ERYTHROCYTE [DISTWIDTH] IN BLOOD BY AUTOMATED COUNT: 14.6 % (ref 10–15)
GFR SERPL CREATININE-BSD FRML MDRD: 81 ML/MIN/1.73M2
GLUCOSE SERPL-MCNC: 108 MG/DL (ref 70–99)
HCT VFR BLD AUTO: 44.5 % (ref 35–47)
HGB BLD-MCNC: 14.7 G/DL (ref 11.7–15.7)
INTERPRETATION ECG - MUSE: NORMAL
LACTATE SERPL-SCNC: 1.1 MMOL/L (ref 0.7–2)
MCH RBC QN AUTO: 30.7 PG (ref 26.5–33)
MCHC RBC AUTO-ENTMCNC: 33 G/DL (ref 31.5–36.5)
MCV RBC AUTO: 93 FL (ref 78–100)
P AXIS - MUSE: 33 DEGREES
PLATELET # BLD AUTO: 176 10E3/UL (ref 150–450)
POTASSIUM SERPL-SCNC: 3.8 MMOL/L (ref 3.4–5.3)
PR INTERVAL - MUSE: 184 MS
PROT SERPL-MCNC: 5.6 G/DL (ref 6.4–8.3)
QRS DURATION - MUSE: 86 MS
QT - MUSE: 370 MS
QTC - MUSE: 486 MS
R AXIS - MUSE: -51 DEGREES
RBC # BLD AUTO: 4.79 10E6/UL (ref 3.8–5.2)
SODIUM SERPL-SCNC: 141 MMOL/L (ref 136–145)
SYSTOLIC BLOOD PRESSURE - MUSE: 198 MMHG
T AXIS - MUSE: 70 DEGREES
VENTRICULAR RATE- MUSE: 104 BPM
WBC # BLD AUTO: 9.3 10E3/UL (ref 4–11)

## 2023-05-28 PROCEDURE — 120N000001 HC R&B MED SURG/OB

## 2023-05-28 PROCEDURE — 80053 COMPREHEN METABOLIC PANEL: CPT | Performed by: HOSPITALIST

## 2023-05-28 PROCEDURE — 36415 COLL VENOUS BLD VENIPUNCTURE: CPT | Performed by: HOSPITALIST

## 2023-05-28 PROCEDURE — 258N000003 HC RX IP 258 OP 636: Performed by: HOSPITALIST

## 2023-05-28 PROCEDURE — 250N000013 HC RX MED GY IP 250 OP 250 PS 637: Performed by: HOSPITALIST

## 2023-05-28 PROCEDURE — 250N000011 HC RX IP 250 OP 636: Performed by: FAMILY MEDICINE

## 2023-05-28 PROCEDURE — 99232 SBSQ HOSP IP/OBS MODERATE 35: CPT | Performed by: HOSPITALIST

## 2023-05-28 PROCEDURE — 85027 COMPLETE CBC AUTOMATED: CPT | Performed by: HOSPITALIST

## 2023-05-28 PROCEDURE — 250N000011 HC RX IP 250 OP 636: Performed by: HOSPITALIST

## 2023-05-28 PROCEDURE — 73501 X-RAY EXAM HIP UNI 1 VIEW: CPT | Mod: RT

## 2023-05-28 PROCEDURE — 83605 ASSAY OF LACTIC ACID: CPT | Performed by: HOSPITALIST

## 2023-05-28 RX ORDER — LABETALOL HYDROCHLORIDE 5 MG/ML
20 INJECTION, SOLUTION INTRAVENOUS EVERY 4 HOURS PRN
Status: DISCONTINUED | OUTPATIENT
Start: 2023-05-28 | End: 2023-05-30 | Stop reason: HOSPADM

## 2023-05-28 RX ORDER — LOSARTAN POTASSIUM 50 MG/1
50 TABLET ORAL 2 TIMES DAILY
Status: DISCONTINUED | OUTPATIENT
Start: 2023-05-28 | End: 2023-05-30 | Stop reason: HOSPADM

## 2023-05-28 RX ORDER — DOXAZOSIN 1 MG/1
1 TABLET ORAL AT BEDTIME
Status: DISCONTINUED | OUTPATIENT
Start: 2023-05-28 | End: 2023-05-30 | Stop reason: HOSPADM

## 2023-05-28 RX ORDER — HALOPERIDOL 5 MG/ML
2 INJECTION INTRAMUSCULAR EVERY 6 HOURS PRN
Status: DISCONTINUED | OUTPATIENT
Start: 2023-05-28 | End: 2023-05-30 | Stop reason: HOSPADM

## 2023-05-28 RX ORDER — QUETIAPINE FUMARATE 25 MG/1
25 TABLET, FILM COATED ORAL AT BEDTIME
Status: DISCONTINUED | OUTPATIENT
Start: 2023-05-28 | End: 2023-05-30 | Stop reason: HOSPADM

## 2023-05-28 RX ORDER — NAPROXEN 250 MG/1
250 TABLET ORAL EVERY 12 HOURS PRN
Status: DISCONTINUED | OUTPATIENT
Start: 2023-05-28 | End: 2023-05-30 | Stop reason: HOSPADM

## 2023-05-28 RX ADMIN — SENNOSIDES 1 TABLET: 8.6 TABLET, FILM COATED ORAL at 12:05

## 2023-05-28 RX ADMIN — HYDRALAZINE HYDROCHLORIDE 10 MG: 20 INJECTION INTRAMUSCULAR; INTRAVENOUS at 11:55

## 2023-05-28 RX ADMIN — SODIUM CHLORIDE: 9 INJECTION, SOLUTION INTRAVENOUS at 06:50

## 2023-05-28 RX ADMIN — ENOXAPARIN SODIUM 40 MG: 40 INJECTION SUBCUTANEOUS at 20:41

## 2023-05-28 RX ADMIN — SENNOSIDES 1 TABLET: 8.6 TABLET, FILM COATED ORAL at 20:40

## 2023-05-28 RX ADMIN — LOSARTAN POTASSIUM 50 MG: 50 TABLET, FILM COATED ORAL at 13:03

## 2023-05-28 RX ADMIN — HALOPERIDOL LACTATE 2 MG: 5 INJECTION, SOLUTION INTRAMUSCULAR at 01:09

## 2023-05-28 RX ADMIN — QUETIAPINE FUMARATE 25 MG: 25 TABLET ORAL at 20:40

## 2023-05-28 RX ADMIN — NAPROXEN 250 MG: 250 TABLET ORAL at 14:24

## 2023-05-28 RX ADMIN — LOSARTAN POTASSIUM 50 MG: 50 TABLET, FILM COATED ORAL at 20:40

## 2023-05-28 RX ADMIN — SERTRALINE HYDROCHLORIDE 50 MG: 50 TABLET ORAL at 20:40

## 2023-05-28 RX ADMIN — DOXAZOSIN 1 MG: 1 TABLET ORAL at 20:40

## 2023-05-28 RX ADMIN — SODIUM CHLORIDE: 9 INJECTION, SOLUTION INTRAVENOUS at 20:41

## 2023-05-28 ASSESSMENT — ACTIVITIES OF DAILY LIVING (ADL)
ADLS_ACUITY_SCORE: 57
ADLS_ACUITY_SCORE: 63
ADLS_ACUITY_SCORE: 57
ADLS_ACUITY_SCORE: 63
ADLS_ACUITY_SCORE: 68
ADLS_ACUITY_SCORE: 68
ADLS_ACUITY_SCORE: 63
ADLS_ACUITY_SCORE: 57
ADLS_ACUITY_SCORE: 63
ADLS_ACUITY_SCORE: 57

## 2023-05-28 NOTE — PROVIDER NOTIFICATION
BP was elevated 211/98 on previous day shift. Recheck 146/78. No hydralazine given.    Molly c/o R hip pain, appears reddened/bruised. Hip xray ordered. Unable to complete xray due to agitation after niece, Neelima left. Dr. Johnson aware. Plan to retry in AM with family present - pt much more cooperative with family. Sitter at bedside. Pt agitated, verbally and physically aggressive. Attempts to kick at staff, attempts to get out of bed. Not cooperative.     Pt became more agitated around 0030. Difficult to keep in bed. Rude to staff, saying inappropriate things to staff, kicking at staff. Dr. Rivera notified and prn haldol ordered. Pt received one dose and behavior became normal again. Pleasant with staff, cooperative, smiling but still confused. Afterwards Molly appeared to sleep well the rest of the shift. It appeared to me like Molly was sundowning during these outbursts throughout the night.

## 2023-05-28 NOTE — PLAN OF CARE
Goal Outcome Evaluation:       Pt confused, alert to self. No sign/symptom of pain noted. Got restless and agitated, pulling iv lines and attempting to leave. Prn haldol given with good results. Pt is calm and resting.  Sitter at bedside, NS infusing at 75 ml/hr.

## 2023-05-28 NOTE — PLAN OF CARE
Problem: Plan of Care - These are the overarching goals to be used throughout the patient stay.    Goal: Absence of Hospital-Acquired Illness or Injury  Outcome: Progressing  Intervention: Identify and Manage Fall Risk  Recent Flowsheet Documentation  Taken 5/28/2023 1616 by Clari Gandhi, RN  Safety Promotion/Fall Prevention:   activity supervised   bedside attendant   clutter free environment maintained   increase visualization of patient   lighting adjusted   mobility aid in reach   nonskid shoes/slippers when out of bed   patient and family education   room organization consistent     Problem: Risk for Delirium  Goal: Improved Behavioral Control  Outcome: Progressing  Intervention: Minimize Safety Risk  Recent Flowsheet Documentation  Taken 5/28/2023 1616 by Clari Gandhi RN  Enhanced Safety Measures:  at bedside   Goal Outcome Evaluation:      Plan of Care Reviewed With: patient      Patient was a little agitated at beginning of shift but cooperative for the most part. NS running at 75ml/hr. /82. Sitter at bedside for safety.

## 2023-05-28 NOTE — CONSULTS
Care Management Initial Consult    General Information  Assessment completed with: Pt diego Barnes over phone     Primary Care Provider verified and updated as needed:   Yes  Readmission within the last 30 days:   no        Advance Care Planning:    granddaughter states pt has at facility       Communication Assessment  Patient's communication style: spoken language (English or Bilingual)    Hearing Difficulty or Deaf: no   Wear Glasses or Blind: other (see comments) (unknown)    Cognitive  Cognitive/Neuro/Behavioral: .WDL except, orientation  Level of Consciousness: confused  Arousal Level: opens eyes spontaneously  Orientation: disoriented to, place, time, situation  Mood/Behavior: restless, uncooperative  Best Language: 0 - No aphasia  Speech: clear    Living Environment:   People in home:    alone   Current living Arrangements: Assisted living at [facility]       Able to return to prior arrangements: yes        Family/Social Support:  Care provided by: other (see comments) (facility)- pt has meals, bathing assistance, medication passing. Is able to add in cares.   Provides care for:  No one, unable to care for self                Description of Support System:    Supportive, involved       Current Resources:   Patient receiving home care services:  no     Community Resources:  none  Equipment currently used at home: other (see comments) (unknown)  Supplies currently used at home:  unknown    Employment/Financial:  Employment Status:     retired     Financial Concerns:  None identified           Does the patient's insurance plan have a 3 day qualifying hospital stay waiver?  No    Lifestyle & Psychosocial Needs:  Social Determinants of Health     Tobacco Use: Not on file   Alcohol Use: Not on file   Financial Resource Strain: Not on file   Food Insecurity: Not on file   Transportation Needs: Not on file   Physical Activity: Not on file   Stress: Not on file   Social Connections: Not on file   Intimate  Partner Violence: Not on file   Depression: Not on file   Housing Stability: Not on file       Functional Status:  Prior to admission patient needed assistance: bathing, meal planning, medications, walk to meals.              Mental Health Status:NA          Chemical Dependency Status:NA                Values/Beliefs:  Spiritual, Cultural Beliefs, Sikhism Practices, Values that affect care:    no             Additional Information:  SW visited pt in pt room, pt asleep. SW called pt granddalovely Barnes, she lives in [place] and pt niece Neelima lives 90 minutes away from pt. Granddaughter is planning on having pt move closer to her in [place]. Pt is able to add in more services at [facility], and granddaughter is planning on doing that until she moves pt to [place]. Per Molly. Pt has ACP documents.   JULIEN Regalado at [facility]- verified pt services and is aware of pt hospitalization.  [phone]     PHONG Espinoza

## 2023-05-28 NOTE — PROGRESS NOTES
[facility]    Medicine Progress Note - Hospitalist Service    Date of Admission:  5/27/2023    Assessment & Plan    Patient is an 82 year old female with history of dementia and hypertension who presented from assisted living with bilateral leg weakness.  Found to have tachycardia with hypoxia and tachypnea, mild leukocytosis, lactic acidosis suggestive of SIRS with no obvious source of infection.       #SIRS  Patient has dementia and not reliable historian  UA shows no evidence of infection  COVID/flu/RSV negative  CT chest no evidence of infection, other acute disease  WBC now back to normal without antibiotics    #Elevated lactic acid, mild  Resolved with IV fluids    #Acute hypoxic respiratory failure  Continue supplemental O2 as needed to keep O2 sat 88% and higher  ?underlying COPD, no obvious exacerbation    #Bilateral lower extremity weakness  CT head and C-spine showed no acute process  MRI/MRA head and neck limited due to motion, but negative for acute ischemia.  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  Stroke neuro consulted  Fall precautions  PT/OT     #Hypertension  PTA losartan  Hydralazine as needed    #Mood disorder, unspecified  PTA Zoloft    #Dementia with behavioral disturbance  1:1 for safety  Patient likely needs memory care placement as opposed to going back to assisted living      VTE ppx: Lovenox     Diet: Combination Diet Regular Diet Adult    Ordoñez Catheter: Not present  Lines: None     Cardiac Monitoring: None  Code Status: No CPR- Do NOT Intubate      Clinically Significant Risk Factors Present on Admission           # Hypercalcemia: corrected calcium is >10.1, will monitor as appropriate    # Hypoalbuminemia: Lowest albumin = 3.3 g/dL at 5/28/2023  7:50 AM, will monitor as appropriate     # Hypertension: Home medication list includes antihypertensive(s)      # Overweight: Estimated body mass index is 29.67 kg/m  as calculated from the following:    Height as of  "this encounter: 1.626 m (5' 4\").    Weight as of this encounter: 78.4 kg (172 lb 13.5 oz).            Disposition Plan     Expected Discharge Date: 05/29/2023                  Dago Cassidy DO  Hospitalist Service  [facility]  Securely message with [employer] (more info)  Text page via [employer] Paging/Directory   ______________________________________________________________________    Interval History   Patient was agitated overnight. Resting comfortably this morning, but upon waking remains agitated. Refused breakfast.    Physical Exam   Vital Signs: Temp: 97.8  F (36.6  C) Temp src: Axillary BP: (!) 159/76 Pulse: 100   Resp: 20 SpO2: 94 % O2 Device: None (Room air) Oxygen Delivery: 2 LPM  Weight: 172 lbs 13.45 oz    General Appearance:  No acute distress  Respiratory: Clear to auscultation bilaterally  Cardiovascular: Regular rate and rhythm      Medical Decision Making             Data     I have personally reviewed the following data over the past 24 hrs:    9.3  \   14.7   / 176     141 108 (H) 20.4 /  108 (H)   3.8 23 0.74 \       ALT: 18 AST: 24 AP: 59 TBILI: 0.8   ALB: 3.3 (L) TOT PROTEIN: 5.6 (L) LIPASE: 17       Trop: 19 (H) BNP: N/A       TSH: 3.41 T4: N/A A1C: N/A       Procal: N/A CRP: N/A Lactic Acid: 1.1       INR:  1.10 PTT:  25   D-dimer:  N/A Fibrinogen:  N/A       Imaging results reviewed over the past 24 hrs:   Recent Results (from the past 24 hour(s))   XR Knee Bilateral 1/2 Views    Narrative    EXAM: XR KNEE BILATERAL 1/2 VIEWS  LOCATION: [facility]  DATE/TIME: 5/27/2023 1:32 PM CDT    INDICATION: knee pain after fall  COMPARISON: None.      Impression    IMPRESSION:   RIGHT KNEE: Moderate medial and mild patellofemoral compartment degenerative arthritis. No acute fracture. No effusion.    LEFT KNEE: Mild medial and patellofemoral compartment degenerative arthritis. No acute fracture. No effusion.   CT Head w/o Contrast    Narrative    EXAM: CT HEAD " W/O CONTRAST, CT CERVICAL SPINE W/O CONTRAST  LOCATION: [facility]  DATE/TIME: 5/27/2023 1:35 PM CDT    INDICATION: Slurred speech, bilatera leg weakness L>R, left lower facial droop, fall  COMPARISON: None.  TECHNIQUE:   1) Routine CT Head without IV contrast. Multiplanar reformats. Dose reduction techniques were used.  2) Routine CT Cervical Spine without IV contrast. Multiplanar reformats. Dose reduction techniques were used.    FINDINGS:   HEAD CT:   INTRACRANIAL CONTENTS: No intracranial hemorrhage, extraaxial collection, or mass effect.  No CT evidence of acute infarct. Moderate presumed chronic small vessel ischemic changes. Moderate generalized volume loss. No hydrocephalus.     VISUALIZED ORBITS/SINUSES/MASTOIDS: No intraorbital abnormality. No paranasal sinus mucosal disease. No middle ear or mastoid effusion.    BONES/SOFT TISSUES: No acute abnormality.    CERVICAL SPINE CT:   VERTEBRA: Normal vertebral body heights and alignment. No fracture or posttraumatic subluxation.     CANAL/FORAMINA: No canal or neural foraminal stenosis.    PARASPINAL: No extraspinal abnormality. Visualized lung fields are clear.      Impression    IMPRESSION:  HEAD CT:  1.  No CT evidence for acute intracranial process.  2.  Brain atrophy and presumed chronic microvascular ischemic changes as above.    CERVICAL SPINE CT:  1.  No CT evidence for acute fracture or post traumatic subluxation.   CT Cervical Spine w/o Contrast    Narrative    EXAM: CT HEAD W/O CONTRAST, CT CERVICAL SPINE W/O CONTRAST  LOCATION: [facility]  DATE/TIME: 5/27/2023 1:35 PM CDT    INDICATION: Slurred speech, bilatera leg weakness L>R, left lower facial droop, fall  COMPARISON: None.  TECHNIQUE:   1) Routine CT Head without IV contrast. Multiplanar reformats. Dose reduction techniques were used.  2) Routine CT Cervical Spine without IV contrast. Multiplanar reformats. Dose reduction techniques were  used.    FINDINGS:   HEAD CT:   INTRACRANIAL CONTENTS: No intracranial hemorrhage, extraaxial collection, or mass effect.  No CT evidence of acute infarct. Moderate presumed chronic small vessel ischemic changes. Moderate generalized volume loss. No hydrocephalus.     VISUALIZED ORBITS/SINUSES/MASTOIDS: No intraorbital abnormality. No paranasal sinus mucosal disease. No middle ear or mastoid effusion.    BONES/SOFT TISSUES: No acute abnormality.    CERVICAL SPINE CT:   VERTEBRA: Normal vertebral body heights and alignment. No fracture or posttraumatic subluxation.     CANAL/FORAMINA: No canal or neural foraminal stenosis.    PARASPINAL: No extraspinal abnormality. Visualized lung fields are clear.      Impression    IMPRESSION:  HEAD CT:  1.  No CT evidence for acute intracranial process.  2.  Brain atrophy and presumed chronic microvascular ischemic changes as above.    CERVICAL SPINE CT:  1.  No CT evidence for acute fracture or post traumatic subluxation.   CT Chest Abdomen Pelvis w/o Contrast    Narrative    EXAM: CT CHEST ABDOMEN PELVIS W/O CONTRAST  LOCATION: [facility]  DATE/TIME: 5/27/2023 1:37 PM CDT    INDICATION: low chest wall pain and upper abdominal pain after fall  COMPARISON: None.  TECHNIQUE: CT scan of the chest, abdomen, and pelvis was performed without IV contrast. Multiplanar reformats were obtained. Dose reduction techniques were used.   CONTRAST: None.    FINDINGS:   LUNGS AND PLEURA: No focal airspace consolidation or pleural effusion.    MEDIASTINUM/AXILLAE: No evidence of acute injury. 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 No suspicious lymphadenopathy. Small hiatal hernia.    CORONARY ARTERY CALCIFICATION: Severe and/or stents.    HEPATOBILIARY: Normal.    PANCREAS: Normal.    SPLEEN: Normal.    ADRENAL GLANDS: Normal.    KIDNEYS/BLADDER: No hydronephrosis. Urinary bladder is unremarkable.    BOWEL: Diverticulosis of the colon.  No acute inflammatory change. No obstruction. No mesenteric hematoma or pneumoperitoneum.    LYMPH NODES: Normal.    VASCULATURE: Moderate calcified atherosclerosis.    PELVIC ORGANS: Hysterectomy.    MUSCULOSKELETAL: No acute bony abnormality. Healed left rib fractures noted. Left shoulder arthroplasty partially visualized.      Impression    IMPRESSION:  1.  No evidence of acute trauma in the chest, abdomen or pelvis.  2.  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   MR Brain w/o & w Contrast    Narrative    EXAM: MR BRAIN W/O and W CONTRAST, MRA BRAIN ([place]) W/O CONTRAST, MRA NECK (CAROTIDS) W/O and W CONTRAST  LOCATION: [facility]  DATE/TIME: 5/27/2023 6:01 PM CDT    INDICATION: bilateral leg weakness L>R, slurred speech, left lower facial droop  COMPARISON: CT head same day.  CONTRAST: 7ml gadavist  TECHNIQUE:  1) Routine multiplanar multisequence head MRI without and with intravenous contrast.  2) 3D time-of-flight head MRA without intravenous contrast.  3) Neck MRA without and with IV contrast. Stenosis measurements made according to NASCET criteria unless otherwise specified.    FINDINGS: Examination is limited due to patient motion artifact.    HEAD MRI:  INTRACRANIAL CONTENTS: No acute ischemia. Multifocal chronic lacunar infarcts, including left hemipons bilateral thalami, and bilateral corona radiata. No mass effect, acute hemorrhage, or obvious extra-axial collection. Patchy and confluent nonspecific   T2/FLAIR hyperintensities within the cerebral white matter most consistent with chronic microvascular ischemic change. Unchanged proportional prominence of the ventricles and sulci is compatible with diffuse cerebral volume loss. Normal position of the   cerebellar tonsils. Postcontrast sequences are markedly motion degraded and essentially nondiagnostic.    SELLA: No abnormality accounting for technique.    OSSEOUS STRUCTURES/SOFT  TISSUES: Normal marrow signal.    ORBITS: No visible intraorbital abnormality.     SINUSES/MASTOIDS: No paranasal sinus mucosal disease. No middle ear or mastoid effusion.    HEAD MRA:   ANTERIOR CIRCULATION: No proximal branch vessel occlusion. Apparent luminal tapering and mildly attenuated flow related enhancement at the distal M1 and proximal M2 segments bilaterally is suspected on a technical/artifactual basis given symmetry and   better demonstrated vessel enhancement on postcontrast MRA sequences. Mild atherosclerotic luminal irregularity about the bilateral carotid siphons. No aneurysm or high-flow vascular malformation. Suspect aplastic A1 segment on the right with robust A1   segment on the left as well as patent anterior communicating artery and bilateral A2 segments.     POSTERIOR CIRCULATION: Suspect focal high-grade stenosis at the P1 segment of the left PCA (series 1 image 104), perhaps with focal poststenotic dilation, with attenuated flow related enhancement in the downstream P2 and further distal branch vessels of   the left PCA. No aneurysm or high-flow vascular malformation. Patent posterior communicating artery on the right provides predominant anterior supply to the right PCA.    NECK MRA:   RIGHT CAROTID: Patent without flow-limiting stenosis by NASCET criteria at the carotid bifurcation. No dissection.    LEFT CAROTID: Patent without flow-limiting stenosis by NASCET criteria at the carotid bifurcation. No dissection.    VERTEBRAL ARTERIES: Codominant vertebral arteries. No flow-limiting stenosis or dissection.    AORTIC ARCH: Three-vessel aortic arch configuration. Visualized great vessels are widely patent.      Impression    IMPRESSION:  HEAD MRI:  1.  Motion limited examination. Postcontrast sequences are essentially nondiagnostic.   2.  No acute ischemia or mass effect.  3.  Moderate diffuse cerebral volume loss and features compatible with chronic microangiopathic ischemic white matter  changes. Scattered chronic lacunar infarcts.    HEAD MRA:  1.  Motion limited exam.  2.  Suspect focal high-grade stenosis at the P1 segment of the left PCA with attenuated downstream flow related enhancement. If further imaging assessment is warranted clinically, CTA may provide additional detail.  3.  No definite aneurysm or vascular malformation.    NECK MRA:  1.  No flow-limiting stenosis or findings of dissection.       MRA Neck (Carotids) wo & w Contrast    Narrative    EXAM: MR BRAIN W/O and W CONTRAST, MRA BRAIN ([place]) W/O CONTRAST, MRA NECK (CAROTIDS) W/O and W CONTRAST  LOCATION: [facility]  DATE/TIME: 5/27/2023 6:01 PM CDT    INDICATION: bilateral leg weakness L>R, slurred speech, left lower facial droop  COMPARISON: CT head same day.  CONTRAST: 7ml gadavist  TECHNIQUE:  1) Routine multiplanar multisequence head MRI without and with intravenous contrast.  2) 3D time-of-flight head MRA without intravenous contrast.  3) Neck MRA without and with IV contrast. Stenosis measurements made according to NASCET criteria unless otherwise specified.    FINDINGS: Examination is limited due to patient motion artifact.    HEAD MRI:  INTRACRANIAL CONTENTS: No acute ischemia. Multifocal chronic lacunar infarcts, including left hemipons bilateral thalami, and bilateral corona radiata. No mass effect, acute hemorrhage, or obvious extra-axial collection. Patchy and confluent nonspecific   T2/FLAIR hyperintensities within the cerebral white matter most consistent with chronic microvascular ischemic change. Unchanged proportional prominence of the ventricles and sulci is compatible with diffuse cerebral volume loss. Normal position of the   cerebellar tonsils. Postcontrast sequences are markedly motion degraded and essentially nondiagnostic.    SELLA: No abnormality accounting for technique.    OSSEOUS STRUCTURES/SOFT TISSUES: Normal marrow signal.    ORBITS: No visible intraorbital  abnormality.     SINUSES/MASTOIDS: No paranasal sinus mucosal disease. No middle ear or mastoid effusion.    HEAD MRA:   ANTERIOR CIRCULATION: No proximal branch vessel occlusion. Apparent luminal tapering and mildly attenuated flow related enhancement at the distal M1 and proximal M2 segments bilaterally is suspected on a technical/artifactual basis given symmetry and   better demonstrated vessel enhancement on postcontrast MRA sequences. Mild atherosclerotic luminal irregularity about the bilateral carotid siphons. No aneurysm or high-flow vascular malformation. Suspect aplastic A1 segment on the right with robust A1   segment on the left as well as patent anterior communicating artery and bilateral A2 segments.     POSTERIOR CIRCULATION: Suspect focal high-grade stenosis at the P1 segment of the left PCA (series 1 image 104), perhaps with focal poststenotic dilation, with attenuated flow related enhancement in the downstream P2 and further distal branch vessels of   the left PCA. No aneurysm or high-flow vascular malformation. Patent posterior communicating artery on the right provides predominant anterior supply to the right PCA.    NECK MRA:   RIGHT CAROTID: Patent without flow-limiting stenosis by NASCET criteria at the carotid bifurcation. No dissection.    LEFT CAROTID: Patent without flow-limiting stenosis by NASCET criteria at the carotid bifurcation. No dissection.    VERTEBRAL ARTERIES: Codominant vertebral arteries. No flow-limiting stenosis or dissection.    AORTIC ARCH: Three-vessel aortic arch configuration. Visualized great vessels are widely patent.      Impression    IMPRESSION:  HEAD MRI:  1.  Motion limited examination. Postcontrast sequences are essentially nondiagnostic.   2.  No acute ischemia or mass effect.  3.  Moderate diffuse cerebral volume loss and features compatible with chronic microangiopathic ischemic white matter changes. Scattered chronic lacunar infarcts.    HEAD MRA:  1.   Motion limited exam.  2.  Suspect focal high-grade stenosis at the P1 segment of the left PCA with attenuated downstream flow related enhancement. If further imaging assessment is warranted clinically, CTA may provide additional detail.  3.  No definite aneurysm or vascular malformation.    NECK MRA:  1.  No flow-limiting stenosis or findings of dissection.       MRA Brain ([place]) wo Contrast    Narrative    EXAM: MR BRAIN W/O and W CONTRAST, MRA BRAIN ([place]) W/O CONTRAST, MRA NECK (CAROTIDS) W/O and W CONTRAST  LOCATION: [facility]  DATE/TIME: 5/27/2023 6:01 PM CDT    INDICATION: bilateral leg weakness L>R, slurred speech, left lower facial droop  COMPARISON: CT head same day.  CONTRAST: 7ml gadavist  TECHNIQUE:  1) Routine multiplanar multisequence head MRI without and with intravenous contrast.  2) 3D time-of-flight head MRA without intravenous contrast.  3) Neck MRA without and with IV contrast. Stenosis measurements made according to NASCET criteria unless otherwise specified.    FINDINGS: Examination is limited due to patient motion artifact.    HEAD MRI:  INTRACRANIAL CONTENTS: No acute ischemia. Multifocal chronic lacunar infarcts, including left hemipons bilateral thalami, and bilateral corona radiata. No mass effect, acute hemorrhage, or obvious extra-axial collection. Patchy and confluent nonspecific   T2/FLAIR hyperintensities within the cerebral white matter most consistent with chronic microvascular ischemic change. Unchanged proportional prominence of the ventricles and sulci is compatible with diffuse cerebral volume loss. Normal position of the   cerebellar tonsils. Postcontrast sequences are markedly motion degraded and essentially nondiagnostic.    SELLA: No abnormality accounting for technique.    OSSEOUS STRUCTURES/SOFT TISSUES: Normal marrow signal.    ORBITS: No visible intraorbital abnormality.     SINUSES/MASTOIDS: No paranasal sinus mucosal  disease. No middle ear or mastoid effusion.    HEAD MRA:   ANTERIOR CIRCULATION: No proximal branch vessel occlusion. Apparent luminal tapering and mildly attenuated flow related enhancement at the distal M1 and proximal M2 segments bilaterally is suspected on a technical/artifactual basis given symmetry and   better demonstrated vessel enhancement on postcontrast MRA sequences. Mild atherosclerotic luminal irregularity about the bilateral carotid siphons. No aneurysm or high-flow vascular malformation. Suspect aplastic A1 segment on the right with robust A1   segment on the left as well as patent anterior communicating artery and bilateral A2 segments.     POSTERIOR CIRCULATION: Suspect focal high-grade stenosis at the P1 segment of the left PCA (series 1 image 104), perhaps with focal poststenotic dilation, with attenuated flow related enhancement in the downstream P2 and further distal branch vessels of   the left PCA. No aneurysm or high-flow vascular malformation. Patent posterior communicating artery on the right provides predominant anterior supply to the right PCA.    NECK MRA:   RIGHT CAROTID: Patent without flow-limiting stenosis by NASCET criteria at the carotid bifurcation. No dissection.    LEFT CAROTID: Patent without flow-limiting stenosis by NASCET criteria at the carotid bifurcation. No dissection.    VERTEBRAL ARTERIES: Codominant vertebral arteries. No flow-limiting stenosis or dissection.    AORTIC ARCH: Three-vessel aortic arch configuration. Visualized great vessels are widely patent.      Impression    IMPRESSION:  HEAD MRI:  1.  Motion limited examination. Postcontrast sequences are essentially nondiagnostic.   2.  No acute ischemia or mass effect.  3.  Moderate diffuse cerebral volume loss and features compatible with chronic microangiopathic ischemic white matter changes. Scattered chronic lacunar infarcts.    HEAD MRA:  1.  Motion limited exam.  2.  Suspect focal high-grade stenosis at the  P1 segment of the left PCA with attenuated downstream flow related enhancement. If further imaging assessment is warranted clinically, CTA may provide additional detail.  3.  No definite aneurysm or vascular malformation.    NECK MRA:  1.  No flow-limiting stenosis or findings of dissection.

## 2023-05-28 NOTE — PLAN OF CARE
Problem: Risk for Delirium  Goal: Optimal Coping  Outcome: Progressing     Problem: Risk for Delirium  Goal: Improved Behavioral Control  Intervention: Minimize Safety Risk  Recent Flowsheet Documentation  Taken 5/27/2023 1815 by Clari Gandhi RN  Enhanced Safety Measures:  at bedside   Goal Outcome Evaluation:      Plan of Care Reviewed With: patient      Patient alert to self. Chandrakant has been at bedside. /98, messagemami GALLARDO. NS running at 75ml/hr. Assist of one with walker and gait belt. Bedside attendant.

## 2023-05-29 ENCOUNTER — APPOINTMENT (OUTPATIENT)
Dept: PHYSICAL THERAPY | Facility: HOSPITAL | Age: 83
DRG: 152 | End: 2023-05-29
Attending: HOSPITALIST
Payer: MEDICARE

## 2023-05-29 ENCOUNTER — APPOINTMENT (OUTPATIENT)
Dept: OCCUPATIONAL THERAPY | Facility: HOSPITAL | Age: 83
DRG: 152 | End: 2023-05-29
Attending: HOSPITALIST
Payer: MEDICARE

## 2023-05-29 ENCOUNTER — APPOINTMENT (OUTPATIENT)
Dept: RADIOLOGY | Facility: HOSPITAL | Age: 83
DRG: 152 | End: 2023-05-29
Attending: HOSPITALIST
Payer: MEDICARE

## 2023-05-29 LAB
ALBUMIN SERPL BCG-MCNC: 3.3 G/DL (ref 3.5–5.2)
ALP SERPL-CCNC: 54 U/L (ref 35–104)
ALT SERPL W P-5'-P-CCNC: 21 U/L (ref 10–35)
ANION GAP SERPL CALCULATED.3IONS-SCNC: 11 MMOL/L (ref 7–15)
AST SERPL W P-5'-P-CCNC: 22 U/L (ref 10–35)
BASOPHILS # BLD AUTO: 0.1 10E3/UL (ref 0–0.2)
BASOPHILS NFR BLD AUTO: 1 %
BILIRUB SERPL-MCNC: 0.7 MG/DL
BUN SERPL-MCNC: 14.4 MG/DL (ref 8–23)
CALCIUM SERPL-MCNC: 8.9 MG/DL (ref 8.8–10.2)
CHLORIDE SERPL-SCNC: 109 MMOL/L (ref 98–107)
CREAT SERPL-MCNC: 0.65 MG/DL (ref 0.51–0.95)
DEPRECATED HCO3 PLAS-SCNC: 22 MMOL/L (ref 22–29)
EOSINOPHIL # BLD AUTO: 0.3 10E3/UL (ref 0–0.7)
EOSINOPHIL NFR BLD AUTO: 6 %
ERYTHROCYTE [DISTWIDTH] IN BLOOD BY AUTOMATED COUNT: 14.7 % (ref 10–15)
GFR SERPL CREATININE-BSD FRML MDRD: 88 ML/MIN/1.73M2
GLUCOSE SERPL-MCNC: 103 MG/DL (ref 70–99)
HCT VFR BLD AUTO: 45.4 % (ref 35–47)
HGB BLD-MCNC: 15 G/DL (ref 11.7–15.7)
HOLD SPECIMEN: NORMAL
IMM GRANULOCYTES # BLD: 0 10E3/UL
IMM GRANULOCYTES NFR BLD: 0 %
LYMPHOCYTES # BLD AUTO: 1.4 10E3/UL (ref 0.8–5.3)
LYMPHOCYTES NFR BLD AUTO: 25 %
MCH RBC QN AUTO: 30.4 PG (ref 26.5–33)
MCHC RBC AUTO-ENTMCNC: 33 G/DL (ref 31.5–36.5)
MCV RBC AUTO: 92 FL (ref 78–100)
MONOCYTES # BLD AUTO: 0.4 10E3/UL (ref 0–1.3)
MONOCYTES NFR BLD AUTO: 7 %
NEUTROPHILS # BLD AUTO: 3.4 10E3/UL (ref 1.6–8.3)
NEUTROPHILS NFR BLD AUTO: 61 %
NRBC # BLD AUTO: 0 10E3/UL
NRBC BLD AUTO-RTO: 0 /100
PLATELET # BLD AUTO: 190 10E3/UL (ref 150–450)
POTASSIUM SERPL-SCNC: 3.4 MMOL/L (ref 3.4–5.3)
PROT SERPL-MCNC: 5.7 G/DL (ref 6.4–8.3)
RBC # BLD AUTO: 4.93 10E6/UL (ref 3.8–5.2)
SODIUM SERPL-SCNC: 142 MMOL/L (ref 136–145)
WBC # BLD AUTO: 5.6 10E3/UL (ref 4–11)

## 2023-05-29 PROCEDURE — 36415 COLL VENOUS BLD VENIPUNCTURE: CPT | Performed by: HOSPITALIST

## 2023-05-29 PROCEDURE — 97116 GAIT TRAINING THERAPY: CPT | Mod: GP

## 2023-05-29 PROCEDURE — 250N000013 HC RX MED GY IP 250 OP 250 PS 637: Performed by: HOSPITALIST

## 2023-05-29 PROCEDURE — 97535 SELF CARE MNGMENT TRAINING: CPT | Mod: GO

## 2023-05-29 PROCEDURE — 80053 COMPREHEN METABOLIC PANEL: CPT | Performed by: HOSPITALIST

## 2023-05-29 PROCEDURE — 85025 COMPLETE CBC W/AUTO DIFF WBC: CPT | Performed by: HOSPITALIST

## 2023-05-29 PROCEDURE — 97110 THERAPEUTIC EXERCISES: CPT | Mod: GP

## 2023-05-29 PROCEDURE — 99232 SBSQ HOSP IP/OBS MODERATE 35: CPT | Performed by: HOSPITALIST

## 2023-05-29 PROCEDURE — 250N000011 HC RX IP 250 OP 636: Performed by: HOSPITALIST

## 2023-05-29 PROCEDURE — 97165 OT EVAL LOW COMPLEX 30 MIN: CPT | Mod: GO

## 2023-05-29 PROCEDURE — 120N000001 HC R&B MED SURG/OB

## 2023-05-29 PROCEDURE — 97162 PT EVAL MOD COMPLEX 30 MIN: CPT | Mod: GP

## 2023-05-29 PROCEDURE — 71045 X-RAY EXAM CHEST 1 VIEW: CPT

## 2023-05-29 PROCEDURE — 258N000003 HC RX IP 258 OP 636: Performed by: HOSPITALIST

## 2023-05-29 RX ORDER — POLYETHYLENE GLYCOL 3350 17 G/17G
17 POWDER, FOR SOLUTION ORAL DAILY
Status: DISCONTINUED | OUTPATIENT
Start: 2023-05-29 | End: 2023-05-30 | Stop reason: HOSPADM

## 2023-05-29 RX ORDER — ASPIRIN 81 MG/1
81 TABLET ORAL DAILY
Status: DISCONTINUED | OUTPATIENT
Start: 2023-05-29 | End: 2023-05-30 | Stop reason: HOSPADM

## 2023-05-29 RX ADMIN — SERTRALINE HYDROCHLORIDE 50 MG: 50 TABLET ORAL at 20:10

## 2023-05-29 RX ADMIN — ENOXAPARIN SODIUM 40 MG: 40 INJECTION SUBCUTANEOUS at 20:10

## 2023-05-29 RX ADMIN — LOSARTAN POTASSIUM 50 MG: 50 TABLET, FILM COATED ORAL at 08:03

## 2023-05-29 RX ADMIN — Medication 3 MG: at 23:31

## 2023-05-29 RX ADMIN — SENNOSIDES 1 TABLET: 8.6 TABLET, FILM COATED ORAL at 20:10

## 2023-05-29 RX ADMIN — QUETIAPINE FUMARATE 25 MG: 25 TABLET ORAL at 20:10

## 2023-05-29 RX ADMIN — DOXAZOSIN 1 MG: 1 TABLET ORAL at 20:10

## 2023-05-29 RX ADMIN — SODIUM CHLORIDE: 9 INJECTION, SOLUTION INTRAVENOUS at 10:00

## 2023-05-29 RX ADMIN — LOSARTAN POTASSIUM 50 MG: 50 TABLET, FILM COATED ORAL at 20:10

## 2023-05-29 RX ADMIN — POLYETHYLENE GLYCOL 3350 17 G: 17 POWDER, FOR SOLUTION ORAL at 08:03

## 2023-05-29 RX ADMIN — Medication 81 MG: at 15:32

## 2023-05-29 RX ADMIN — SENNOSIDES 1 TABLET: 8.6 TABLET, FILM COATED ORAL at 08:03

## 2023-05-29 ASSESSMENT — ACTIVITIES OF DAILY LIVING (ADL)
ADLS_ACUITY_SCORE: 61
ADLS_ACUITY_SCORE: 61
ADLS_ACUITY_SCORE: 68
ADLS_ACUITY_SCORE: 68
ADLS_ACUITY_SCORE: 61
ADLS_ACUITY_SCORE: 68
ADLS_ACUITY_SCORE: 61
ADLS_ACUITY_SCORE: 68
ADLS_ACUITY_SCORE: 61

## 2023-05-29 NOTE — PLAN OF CARE
Problem: Risk for Delirium  Goal: Improved Behavioral Control  Outcome: Progressing  Intervention: Minimize Safety Risk  Recent Flowsheet Documentation  Taken 5/28/2023 2039 by Jasson Gonsalves, RN  Enhanced Safety Measures:  at bedside     Problem: Plan of Care - These are the overarching goals to be used throughout the patient stay.    Goal: Absence of Hospital-Acquired Illness or Injury  Intervention: Prevent Skin Injury  Recent Flowsheet Documentation  Taken 5/28/2023 2039 by Jasson Gonsalves, RN  Body Position: position changed independently   Goal Outcome Evaluation:  Patient denied pain. Remains confused, oriented only to self. Patient irritable, not allowing RN to perform tasks. Redirectable after explanation and education. Purewick in place, voiding well. 1:1 sitter in room for patient safety. balwinder Ortez, updated regarding patient's POC.

## 2023-05-29 NOTE — PROGRESS NOTES
[facility]    Medicine Progress Note - Hospitalist Service    Date of Admission:  5/27/2023    Assessment & Plan   Patient is an 82 year old female with history of dementia and hypertension who presented from assisted living with bilateral leg weakness.  Found to be tachycardic with hypoxia and tachypnea, mild leukocytosis, lactic acidosis suggestive of SIRS with no obvious source of infection.   Patient was confused and agitated yesterday. Doing much better today and appears to be back at her baseline mentation. Did not receive any antibiotics, only gentle IV fluids, seroquel and antihypertensives. Leukocytosis and hypoxia resolved.    #SIRS  Patient has dementia and not reliable historian  UA shows no evidence of infection  COVID/flu/RSV negative  CT chest no evidence of infection, other acute disease  WBC now back to normal without antibiotics    #Elevated lactic acid, mild  Resolved with IV fluids    #Mild hypoxia, resolved  Nasal cannula weaned off  No known underlying chronic lung disease    #Bilateral lower extremity weakness  #Left PCA stenosis  CT head and C-spine showed no acute process  MRI/MRA head and neck limited due to motion, but negative for acute ischemia.  High-grade stenosis of the left PCA seen  Stroke neuro consulted  Start daily aspirin, no statin 2/2 allergy (vs sensitivity) listed to 2 different statins  Fall precautions  PT/OT     #Hypertension  PTA losartan increased  Added low dose cardura  Hydralazine as needed    #Delirium  #Dementia with behavioral disturbance  1:1 no longer needed  Appears to be back to baseline mentation    #Mood disorder, unspecified  PTA Zoloft      VTE ppx: Lovenox       Diet: Combination Diet Regular Diet Adult      Ordoñez Catheter: Not present  Lines: None     Cardiac Monitoring: None  Code Status: No CPR- Do NOT Intubate      Clinically Significant Risk Factors           # Hypercalcemia: corrected calcium is >10.1, will monitor as  "appropriate    # Hypoalbuminemia: Lowest albumin = 3.3 g/dL at 5/29/2023  7:13 AM, will monitor as appropriate            # Obesity: Estimated body mass index is 30.35 kg/m  as calculated from the following:    Height as of this encounter: 1.626 m (5' 4\").    Weight as of this encounter: 80.2 kg (176 lb 12.9 oz)., PRESENT ON ADMISSION          Disposition Plan      Expected Discharge Date: 05/30/2023        Discharge Comments: can not take patient back until 5-30          Dago Cassidy DO  Hospitalist Service  [facility]  Securely message with [employer] (more info)  Text page via [employer] Paging/Directory   ______________________________________________________________________    Interval History   No acute events overnight    Physical Exam   Vital Signs: Temp: 97.9  F (36.6  C) Temp src: Oral BP: (!) 164/86 Pulse: 99   Resp: 16 SpO2: 95 % O2 Device: None (Room air)    Weight: 176 lbs 12.94 oz    General Appearance:  No acute distress  Respiratory: Clear to auscultation bilaterally  Cardiovascular: Regular rate and rhythm      Medical Decision Making             Data     I have personally reviewed the following data over the past 24 hrs:    5.6  \   15.0   / 190     142 109 (H) 14.4 /  103 (H)   3.4 22 0.65 \       ALT: 21 AST: 22 AP: 54 TBILI: 0.7   ALB: 3.3 (L) TOT PROTEIN: 5.7 (L) LIPASE: N/A       Imaging results reviewed over the past 24 hrs:   Recent Results (from the past 24 hour(s))   XR Chest Port 1 View    Narrative    EXAM: XR CHEST PORT 1 VIEW  LOCATION: [facility]  DATE/TIME: 5/29/2023 6:22 AM CDT    INDICATION: Hypoxia.  COMPARISON: CT chest, abdomen and pelvis with IV contrast 05/27/2023.      Impression    IMPRESSION: The patient is rotated and tilted. Minor strand of linear atelectasis in the left base. Both lungs are otherwise clear. No adenopathy or effusion. Cardiac size approaches upper limits of normal with normal pulmonary vascularity. Degenerative "   changes right shoulder and the thoracic spine. Reverse left shoulder. No significant interval change.

## 2023-05-29 NOTE — PLAN OF CARE
"  Problem: Plan of Care - These are the overarching goals to be used throughout the patient stay.    Goal: Plan of Care Review  Description: The Plan of Care Review/Shift note should be completed every shift.  The Outcome Evaluation is a brief statement about your assessment that the patient is improving, declining, or no change.  This information will be displayed automatically on your shift note.  Outcome: Progressing  Goal: Patient-Specific Goal (Individualized)  Description: You can add care plan individualizations to a care plan. Examples of Individualization might be:  \"Parent requests to be called daily at 9am for status\", \"I have a hard time hearing out of my right ear\", or \"Do not touch me to wake me up as it startles me\".  Outcome: Progressing  Goal: Absence of Hospital-Acquired Illness or Injury  Outcome: Progressing  Intervention: Identify and Manage Fall Risk  Recent Flowsheet Documentation  Taken 5/29/2023 0028 by Reagan English, RN  Safety Promotion/Fall Prevention:    bedside attendant    clutter free environment maintained    increased rounding and observation    increase visualization of patient    lighting adjusted    mobility aid in reach    nonskid shoes/slippers when out of bed    patient and family education    room organization consistent    safety round/check completed  Intervention: Prevent Skin Injury  Recent Flowsheet Documentation  Taken 5/29/2023 0028 by Reagan English, RN  Body Position: position changed independently     Problem: Violence Risk or Actual  Goal: Anger and Impulse Control  Outcome: Progressing  Intervention: Minimize Safety Risk  Recent Flowsheet Documentation  Taken 5/29/2023 0028 by Reagan English, RN  Enhanced Safety Measures:  at bedside     Problem: Risk for Delirium  Goal: Improved Behavioral Control  Intervention: Minimize Safety Risk  Recent Flowsheet Documentation  Taken 5/29/2023 0028 by Reagan English, RN  Enhanced Safety Measures: safety " attendant at bedside   Goal Outcome Evaluation:    Patient remains confused and oriented to self only. She is nonsensical and repetitious. She insisted on calling Neelima after midnight. A 1:1 sitter remains at the bedside for safety. Patient did sleep most of the night. /88.

## 2023-05-29 NOTE — PROGRESS NOTES
05/29/23 1040   Appointment Info   Signing Clinician's Name / Credentials (OT) Jolie Alejandra, OTR/L   Living Environment   People in Home facility resident   Current Living Arrangements assisted living   Home Accessibility no concerns   Living Environment Comments Per chart review, patient lives in [facility] and granddaughter lives in [place], pt's granddaughter is planning to move patient to [place] soon to provide more assistance.   Self-Care   Equipment Currently Used at Home walker, rolling  (4WW)   Fall history within last six months yes   Number of times patient has fallen within last six months 1   Activity/Exercise/Self-Care Comment Pt states she is independent with ADLs, states she does have assistance with bathing.   Instrumental Activities of Daily Living (IADL)   IADL Comments Assistance with IADLs provided by [facility].   General Information   Onset of Illness/Injury or Date of Surgery 05/27/23   Referring Physician Siobhan Johnson MD   Patient/Family Therapy Goal Statement (OT) to go home   Additional Occupational Profile Info/Pertinent History of Current Problem Per chart review, pt is a 82 year old female admitted on 5/27/2023. She has PMH of dementia, hypertension who presented form assisted living with b/l leg weakness.  Labs noted to have tachycardia with hypoxia and tachypnea, mild leukocytosis, lactic acidosis suggestive of SIRS with no primary source of infection.   Existing Precautions/Restrictions fall   Limitations/Impairments safety/cognitive   Cognitive Status Examination   Orientation Status orientation to person, place and time  (patient oriented to place and time with minor VC, disoriented to situation)   Cognitive Status Comments Pt with limited initiation and attention during session.   Pain Assessment   Patient Currently in Pain No   Range of Motion Comprehensive   General Range of Motion no range of motion deficits identified   Strength Comprehensive (MMT)   General Manual Muscle Testing  (MMT) Assessment no strength deficits identified   Bed Mobility   Bed Mobility supine-sit   Supine-Sit [place] (Bed Mobility) supervision   Assistive Device (Bed Mobility) bed rails   Transfers   Transfers sit-stand transfer;toilet transfer   Sit-Stand Transfer   Sit-Stand [place] (Transfers) contact guard   Toilet Transfer   Type (Toilet Transfer) sit-stand;stand-sit   [place] Level (Toilet Transfer) contact guard;verbal cues   Assistive Device (Toilet Transfer) grab bars/safety frame;walker, front-wheeled   Balance   Balance Comments Pt demo no significant LOB, unsteady at times with difficulty navigating around objects.   Activities of Daily Living   BADL Assessment/Intervention lower body dressing;grooming;toileting   Lower Body Dressing Assessment/Training   Position (Lower Body Dressing) unsupported sitting   [place] Level (Lower Body Dressing) supervision   Grooming Assessment/Training   Position (Grooming) unsupported standing   [place] Level (Grooming) contact guard assist;verbal cues   Toileting   [place] Level (Toileting) minimum assist (75% patient effort);verbal cues   Clinical Impression   Criteria for Skilled Therapeutic Interventions Met (OT) Yes, treatment indicated   OT Diagnosis Impaired ability to perform ADLs and functional mobility safely.   Influenced by the following impairments SIRS   OT Problem List-Impairments impacting ADL problems related to;activity tolerance impaired;balance;cognition;mobility   Assessment of Occupational Performance 1-3 Performance Deficits   Identified Performance Deficits functional mobility, toileting, grooming/hygiene   Planned Therapy Interventions (OT) ADL retraining;balance training;transfer training;home program guidelines;risk factor education;progressive activity/exercise   Clinical Decision Making Complexity (OT) low complexity   Risk & Benefits of therapy have been explained evaluation/treatment results reviewed;care  plan/treatment goals reviewed;risks/benefits reviewed;current/potential barriers reviewed;participants voiced agreement with care plan;participants included;patient   Clinical Impression Comments Patient would benefit from skilled OT services to promote ability to perform ADLs and functional mobility.   OT Total Evaluation Time   OT Eval, Low Complexity Minutes (37923) 12   OT Goals   Therapy Frequency (OT) 5 times/wk   OT Predicted Duration/Target Date for Goal Attainment 06/05/23   OT Goals Hygiene/Grooming;Transfers;Toilet Transfer/Toileting;Cognition   OT: Hygiene/Grooming supervision/stand-by assist;using adaptive equipment;while standing   OT: Transfer Supervision/stand-by assist;with assistive device   OT: Toilet Transfer/Toileting Supervision/stand-by assist;toilet transfer;cleaning and garment management;using adaptive equipment   OT: Cognitive Patient/caregiver will verbalize understanding of cognitive assessment results/recommendations as needed for safe discharge planning   Self-Care/Home Management   Self-Care/Home Mgmt/ADL, Compensatory, Meal Prep Minutes (89399) 10   Symptoms Noted During/After Treatment (Meal Preparation/Planning Training) fatigue   Treatment Detail/Skilled Intervention Patient completed 10' functional mobility in bedroom walking to the bathroom using FWW, very slow pace frequently running into obstacles, CGA for safety. Pt completed toileting task with CGA/SBA, however required frequent VC for sequencing and initiation. Patient stood at EOS for 5 min with CGA/SBA, no LOB or fatigue with prolonged standing in BLEs. Extended time d/t patient perseverating on tasks. Pt ambulated 12' to recliner using FWW, CGA for safety. Pt left in recliner at end of session with RN 1:1 present in room.   OT Discharge Planning   OT Plan SLUMS/ACLS, safety with functional mobility in room   OT Discharge Recommendation (DC Rec) home with assist;home with home care occupational therapy  (long-term)   OT  Rationale for DC Rec Per chart review, patient's granddaughter is planning to increase pt's services at [facility] upon discharge. Recommend Ax1 for supervision with ADLs for safety d/t cognition, total assistance with IADLs.   OT Brief overview of current status SBA/CGA mobility and ADLs   Total Session Time   Timed Code Treatment Minutes 10   Total Session Time (sum of timed and untimed services) 22

## 2023-05-29 NOTE — PROGRESS NOTES
"   05/29/23 0881   Appointment Info   Signing Clinician's Name / Credentials (PT) Nena Parry PT   Living Environment   People in Home facility resident   Current Living Arrangements assisted living;apartment   Home Accessibility no concerns   Self-Care   Usual Activity Tolerance good   Current Activity Tolerance moderate   Equipment Currently Used at Home walker, rolling  (4WW)   Fall history within last six months yes   Number of times patient has fallen within last six months 1   Activity/Exercise/Self-Care Comment pt states she does her own dressing, toileting.  Meals, household duties provided   General Information   Onset of Illness/Injury or Date of Surgery 05/27/23   Referring Physician SANDY Escobar   Patient/Family Therapy Goals Statement (PT) \"go home\"   Pertinent History of Current Problem (include personal factors and/or comorbidities that impact the POC) an 82 year old female with history of dementia and hypertension who presented from assisted living with bilateral leg weakness.  Found to have tachycardia with hypoxia and tachypnea, mild leukocytosis, lactic acidosis suggestive of SIRS with no obvious source of infection.   Existing Precautions/Restrictions fall   Cognition   Affect/Mental Status (Cognition) WFL   Orientation Status (Cognition) oriented to;person;place   Follows Commands (Cognition) follows one-step commands;over 90% accuracy   Cognitive Status Comments conversation and answering questions appropriate.   Pain Assessment   Patient Currently in Pain No   Range of Motion (ROM)   ROM Comment BLE ROM WFL   Strength (Manual Muscle Testing)   Strength Comments BLE strength 4/5, fatigues fairly easily   Transfers   Transfers sit-stand transfer   Sit-Stand Transfer   Sit-Stand [place] (Transfers) contact guard   Assistive Device (Sit-Stand Transfers) walker, front-wheeled   Comment, (Sit-Stand Transfer) initial standing balance is good   Gait/Stairs (Locomotion)   [place] Level (Gait) " contact guard;verbal cues   Assistive Device (Gait) walker, front-wheeled   Distance in Feet 25   Distance in Feet (Gait) 150'   Pattern (Gait) step-to;step-through   Deviations/Abnormal Patterns (Gait) toan decreased   Comment, (Gait/Stairs) slower pace, but steady, no LOB, has some difficulty navigating the FWW but is use to a 4WW at home   Clinical Impression   Criteria for Skilled Therapeutic Intervention Yes, treatment indicated   PT Diagnosis (PT) impaired functional mobility   Influenced by the following impairments fatigue   Functional limitations due to impairments transfers, gait   Clinical Presentation (PT Evaluation Complexity) Stable/Uncomplicated   Clinical Presentation Rationale presents as diagnosed   Clinical Decision Making (Complexity) moderate complexity   Planned Therapy Interventions (PT) bed mobility training;transfer training;gait training;strengthening   Anticipated Equipment Needs at Discharge (PT) walker, rolling   Risk & Benefits of therapy have been explained care plan/treatment goals reviewed;patient   PT Total Evaluation Time   PT Eval, Moderate Complexity Minutes (40238) 10   Physical Therapy Goals   PT Frequency Daily   PT Predicted Duration/Target Date for Goal Attainment 06/02/23   PT Goals Bed Mobility;Transfers;Gait   PT: Bed Mobility Independent;Supine to/from sit;Rolling   PT: Transfers Modified independent;Sit to/from stand;Bed to/from chair;Assistive device   PT: Gait Supervision/stand-by assist;Rolling walker;150 feet   Interventions   Interventions Quick Adds Therapeutic Procedure;Gait Training   Therapeutic Procedure/Exercise   Ther. Procedure: strength, endurance, ROM, flexibillity Minutes (28066) 12   Symptoms Noted During/After Treatment none   Treatment Detail/Skilled Intervention instructed in and performed BLE ex in sitting, 10-15 reps each, pt states she moves her legs throughout the day as it helps her neuropathy feel better.   Gait Training   Gait Training  "Minutes (12430) 13   Symptoms Noted During/After Treatment (Gait Training) fatigue   Treatment Detail/Skilled Intervention slower pace, no LOB, fatigued toward end of walk   [place] Level (Gait Training) contact guard   Physical Assistance Level (Gait Training) verbal cues;1 person assist   Weight Bearing (Gait Training) full weight-bearing   Assistive Device (Gait Training) rolling walker   Gait Analysis Deviations decreased toan  (difficulty \"steering\"\" the FWW)   Impairments (Gait Analysis/Training) strength decreased   PT Discharge Planning   PT Plan transfers and gt with 4WW (bring), LE strengthening ex   PT Discharge Recommendation (DC Rec) home with assist;home with home care physical therapy  (return to [facility])   PT Rationale for DC Rec pt moving well, recommend home pt eval for safety   PT Brief overview of current status trnasfers and gt with FWW, CGA, up to 150'   Total Session Time   Timed Code Treatment Minutes 25   Total Session Time (sum of timed and untimed services) 35     "

## 2023-05-29 NOTE — PLAN OF CARE
Goal Outcome Evaluation:      Plan of Care Reviewed With: patient    Overall Patient Progress: improvingOverall Patient Progress: improving    Outcome Evaluation: Pt was oriented and pleasant this morning.  She is moving well with a SBA and her walker.  Pt was up in the chair and ate relatively well.  Doctor and care manger told her she could go home today and she was so excited!  Unfortunately the assisted living cannot take her back until tomorrow.  Pt is very upset and angry that she has to stay another night.  Situation explained but she doesn't accept our explanation.  Trialed off the 1:1 and pt did well.  She only sets off the bed alarm when she needs to get up.  Message left for Neelima (salazar).

## 2023-05-29 NOTE — PROGRESS NOTES
"Care Management Follow Up    Length of Stay (days): 2    Expected Discharge Date: 05/29/2023     Concerns to be Addressed:    Discharge planning   Patient plan of care discussed at interdisciplinary rounds: Yes    Anticipated Discharge Disposition:       Anticipated Discharge Services:    Anticipated Discharge DME:      Patient/family educated on Medicare website which has current facility and service quality ratings:    Education Provided on the Discharge Plan:    Patient/Family in Agreement with the Plan:      Referrals Placed by CM/SW:    Private pay costs discussed: Not applicable    Additional Information:  Chart reviewed.    Cm updates:  RNCM called pts balwinder Ortez to see if she is able to pick the patient up today and bring pt back to her [facility]. Awaiting call back.      Pt agreeable to wheelchair transport if pts balwinder cannot pick the pt up.     12:35pm- Spoke to [facility], they are not able to accept pt back until tomorrow now.      Still awaiting harper back from balwinder Ortez.     Nurse Lexi: [phone]        Social Hx:  \"SW visited pt in pt room, pt asleep. SW called pt granddalovely Barnes, she lives in [place] and pt balwinder Ortez lives 90 minutes away from pt. Granddaughter is planning on having pt move closer to her in [place]. Pt is able to add in more services at [facility], and granddaughter is planning on doing that until she moves pt to [place]. Per Molly. Pt has ACP documents.   JULIEN Regalado at [facility]- verified pt services and is aware of pt hospitalization.  [phone] \"      Cm will continue to follow plan of care, review recommendations, and assist with any discharge needs anticipated.       Sangita Chandra RN        "

## 2023-05-29 NOTE — PLAN OF CARE
"  Problem: Plan of Care - These are the overarching goals to be used throughout the patient stay.    Goal: Plan of Care Review  Description: The Plan of Care Review/Shift note should be completed every shift.  The Outcome Evaluation is a brief statement about your assessment that the patient is improving, declining, or no change.  This information will be displayed automatically on your shift note.  Outcome: Progressing  Goal: Patient-Specific Goal (Individualized)  Description: You can add care plan individualizations to a care plan. Examples of Individualization might be:  \"Parent requests to be called daily at 9am for status\", \"I have a hard time hearing out of my right ear\", or \"Do not touch me to wake me up as it startles me\".  Outcome: Progressing  Goal: Absence of Hospital-Acquired Illness or Injury  Outcome: Progressing  Goal: Optimal Comfort and Wellbeing  Outcome: Progressing  Goal: Readiness for Transition of Care  Outcome: Progressing     Problem: Risk for Delirium  Goal: Optimal Coping  Outcome: Progressing  Goal: Improved Behavioral Control  Outcome: Progressing  Goal: Improved Attention and Thought Clarity  Outcome: Progressing  Goal: Improved Sleep  Outcome: Progressing     Problem: Suicide Risk  Goal: Absence of Self-Harm  Outcome: Progressing     Problem: Violence Risk or Actual  Goal: Anger and Impulse Control  Outcome: Progressing   Goal Outcome Evaluation:       Vs stable. She denies any pain and discomfort. Pt alert and oriented xx2 and confused. Pt has been calm and pleasant this evening.She ambulated to bathroom with walker and assist 1.planning discharging back to assisting living tomorrow AM.                  "

## 2023-05-30 ENCOUNTER — APPOINTMENT (OUTPATIENT)
Dept: OCCUPATIONAL THERAPY | Facility: HOSPITAL | Age: 83
DRG: 152 | End: 2023-05-30
Payer: MEDICARE

## 2023-05-30 VITALS
HEART RATE: 116 BPM | RESPIRATION RATE: 16 BRPM | OXYGEN SATURATION: 98 % | DIASTOLIC BLOOD PRESSURE: 68 MMHG | BODY MASS INDEX: 30.19 KG/M2 | HEIGHT: 64 IN | SYSTOLIC BLOOD PRESSURE: 132 MMHG | WEIGHT: 176.81 LBS | TEMPERATURE: 97.3 F

## 2023-05-30 PROCEDURE — 87081 CULTURE SCREEN ONLY: CPT | Performed by: HOSPITALIST

## 2023-05-30 PROCEDURE — 250N000013 HC RX MED GY IP 250 OP 250 PS 637: Performed by: HOSPITALIST

## 2023-05-30 PROCEDURE — 250N000011 HC RX IP 250 OP 636: Performed by: HOSPITALIST

## 2023-05-30 PROCEDURE — 97129 THER IVNTJ 1ST 15 MIN: CPT | Mod: GO

## 2023-05-30 PROCEDURE — 97110 THERAPEUTIC EXERCISES: CPT | Mod: GO

## 2023-05-30 PROCEDURE — 99239 HOSP IP/OBS DSCHRG MGMT >30: CPT | Performed by: HOSPITALIST

## 2023-05-30 RX ORDER — HYDRALAZINE HYDROCHLORIDE 25 MG/1
12.5 TABLET, FILM COATED ORAL 3 TIMES DAILY
Qty: 90 TABLET | Refills: 1
Start: 2023-05-30 | End: 2023-05-30

## 2023-05-30 RX ORDER — AMOXICILLIN 250 MG
1 CAPSULE ORAL 2 TIMES DAILY
Qty: 60 TABLET | Refills: 0 | Status: SHIPPED | OUTPATIENT
Start: 2023-05-30

## 2023-05-30 RX ORDER — CEPHALEXIN 500 MG/1
500 CAPSULE ORAL EVERY 12 HOURS SCHEDULED
Status: DISCONTINUED | OUTPATIENT
Start: 2023-05-30 | End: 2023-05-30 | Stop reason: HOSPADM

## 2023-05-30 RX ORDER — HALOPERIDOL 5 MG/ML
2 INJECTION INTRAMUSCULAR ONCE
Status: DISCONTINUED | OUTPATIENT
Start: 2023-05-30 | End: 2023-05-30 | Stop reason: HOSPADM

## 2023-05-30 RX ORDER — LOSARTAN POTASSIUM 50 MG/1
50 TABLET ORAL 2 TIMES DAILY
Start: 2023-05-30

## 2023-05-30 RX ORDER — CEPHALEXIN 500 MG/1
500 CAPSULE ORAL EVERY 12 HOURS
Qty: 19 CAPSULE | Refills: 0 | Status: SHIPPED | OUTPATIENT
Start: 2023-05-30

## 2023-05-30 RX ORDER — POLYETHYLENE GLYCOL 3350 17 G/17G
17 POWDER, FOR SOLUTION ORAL DAILY PRN
Qty: 510 G | Refills: 0 | Status: SHIPPED | OUTPATIENT
Start: 2023-05-30

## 2023-05-30 RX ORDER — HYDRALAZINE HYDROCHLORIDE 10 MG/1
10 TABLET, FILM COATED ORAL 3 TIMES DAILY
Status: DISCONTINUED | OUTPATIENT
Start: 2023-05-30 | End: 2023-05-30 | Stop reason: HOSPADM

## 2023-05-30 RX ORDER — NAPROXEN SODIUM 220 MG
220 TABLET ORAL 2 TIMES DAILY PRN
Start: 2023-05-30

## 2023-05-30 RX ORDER — HYDRALAZINE HYDROCHLORIDE 25 MG/1
12.5 TABLET, FILM COATED ORAL 3 TIMES DAILY
Qty: 90 TABLET | Refills: 1 | Status: SHIPPED | OUTPATIENT
Start: 2023-05-30

## 2023-05-30 RX ORDER — CEPHALEXIN 500 MG/1
500 CAPSULE ORAL EVERY 12 HOURS
Qty: 19 CAPSULE | Refills: 0
Start: 2023-05-30 | End: 2023-05-30

## 2023-05-30 RX ADMIN — CEPHALEXIN 500 MG: 500 CAPSULE ORAL at 13:03

## 2023-05-30 RX ADMIN — HYDRALAZINE HYDROCHLORIDE 10 MG: 10 TABLET, FILM COATED ORAL at 13:08

## 2023-05-30 RX ADMIN — POLYETHYLENE GLYCOL 3350 17 G: 17 POWDER, FOR SOLUTION ORAL at 09:42

## 2023-05-30 RX ADMIN — HYDRALAZINE HYDROCHLORIDE 10 MG: 20 INJECTION INTRAMUSCULAR; INTRAVENOUS at 11:20

## 2023-05-30 RX ADMIN — LOSARTAN POTASSIUM 50 MG: 50 TABLET, FILM COATED ORAL at 09:42

## 2023-05-30 RX ADMIN — SENNOSIDES 1 TABLET: 8.6 TABLET, FILM COATED ORAL at 09:42

## 2023-05-30 RX ADMIN — Medication 81 MG: at 09:42

## 2023-05-30 ASSESSMENT — ACTIVITIES OF DAILY LIVING (ADL)
ADLS_ACUITY_SCORE: 61
ADLS_ACUITY_SCORE: 60

## 2023-05-30 NOTE — PLAN OF CARE
Goal Outcome Evaluation:    Pt is SBA with walker. Steady gait. Need assistance with dressing. Ate lunch with balwinder Ortez. Discharge instruction given. Patient belongings sent with pt.

## 2023-05-30 NOTE — PROGRESS NOTES
Full note to follow.  Throat pain x 1 week.  Niece also reporting cough, though I haven't heard any here.    Pharngitis on exam with L tonsillar exudate  No rapid strep Ag available.    Keflex 500mg BID x 10 days and check strep culture.  Add hydralazine for uncontrolled HTN.    Likely discharge later today.    Kishor Sanabria MD, [facility]  Internal Medicine Hospitalist  5/30/2023

## 2023-05-30 NOTE — PLAN OF CARE
Physical Therapy Discharge Summary    Reason for therapy discharge:    Discharged to home. Assisted Living    Progress towards therapy goal(s). See goals on Care Plan in [facility] electronic health record for goal details.  Goals partially met.  Barriers to achieving goals:   discharge from facility.

## 2023-05-30 NOTE — PROGRESS NOTES
Cross coverage paged. Patient becoming  increasingly more agitated tonight. Setting off bed alarm several times despite instructions to call. h/o dementia and is confused. raising her voice and forcing staff out of her room. Walking around in her room, very unsteady and not taking direction per her safety. High fall risk.

## 2023-05-30 NOTE — PLAN OF CARE
"  Problem: Risk for Delirium  Goal: Optimal Coping  Outcome: Not Progressing  Goal: Improved Attention and Thought Clarity  Outcome: Not Progressing  Intervention: Maximize Cognitive Function  Recent Flowsheet Documentation  Taken 5/29/2023 2015 by Olena Pisano RN  Reorientation Measures:   clock in view   glasses use encouraged   reorientation provided   Goal Outcome Evaluation: Patient alert, fluctuating orientation. Became increasingly more confused and agitated throughout the evening. Was up frequently to the bathroom both continent and incontinent of bladder.  Setting off bed alarm several times without calling. Is unsteady without assistive devices but was uncooperative when given directions. Began raising her voice stating \"this is my space\" and \"you are not my nurse\".  Provider eventually paged for prns. One time dose of haldol ordered however patient had finally went to sleep and therefor haldol was not given. Slept the rest of the night. Plan for discharge today at 10am, niece will .                           "

## 2023-05-30 NOTE — DISCHARGE SUMMARY
[facility] MEDICINE  DISCHARGE SUMMARY     Primary Care Physician: Carlos Chaves  Admission Date: 5/27/2023   Discharge Provider: Kishor Sanabria MD Discharge Date: 5/30/2023   Diet:   Active Diet and Nourishment Order   Procedures     Combination Diet Regular Diet Adult     Diet       Code Status: No CPR- Do NOT Intubate   Activity: DCACTIVITY: Activity as tolerated        Condition at Discharge: Stable     REASON FOR PRESENTATION(See Admission Note for Details)     Fever    PRINCIPAL & ACTIVE DISCHARGE DIAGNOSES     Principal Problem:    SIRS (systemic inflammatory response syndrome) (H)  Active Problems:    Delirium    Fall, initial encounter      PENDING LABS     Unresulted Labs Ordered in the Past 30 Days of this Admission     Date and Time Order Name Status Description    5/30/2023 12:00 PM Beta hemolytic Strep Group A Culture In process     5/27/2023  1:04 PM Blood Culture Peripheral Blood Preliminary             PROCEDURES ( this hospitalization only)          RECOMMENDATIONS TO OUTPATIENT PROVIDER FOR F/U VISIT     Follow-up Appointments     Follow-up and recommended labs and tests      Follow up with primary care provider, Carlos Chaves, within 7 days   for hospital follow- up.               DISPOSITION     Assisted Living Facility    SUMMARY OF HOSPITAL COURSE:      82F with dementia and HTN who presented from assisted living with bilateral leg weakness.  Found to be tachycardic with hypoxia and tachypnea, mild leukocytosis, lactic acidosis suggestive of SIRS with no obvious source of infection on admission and improvement in leukocytosis without abx.  Hospital course complicated by delirium and uncontrolled HTN.  Now reporting sore throat and has pharyngitis with L tonsillar exudate.  Suspect URI and possible strep pharyngitis as etiology of presenting symptoms.   Overall improving and will discharge with course of keflex while awaiting strep culture  results.    #SIRS - suspect due to URI/pharyngitis.  Discharge with keflex and will need f/u on strep culture results.       #Elevated lactic acid, mild - resolved    #Mild hypoxia, resolved - nothing on CT chest for PNA.       #Bilateral lower extremity weakness  #Left PCA stenosis  CT head and C-spine showed no acute process  MRI/MRA head and neck limited due to motion, but negative for acute ischemia.  High-grade stenosis of the left PCA seen  Stroke neuro consulted  Start daily aspirin, no statin 2/2 allergy (vs sensitivity) listed to 2 different statins  Fall precautions  PT/OT      #Hypertension  PTA losartan increased, added hydralazine     #Delirium  #Dementia with behavioral disturbance  1:1 no longer needed  Appears to be back to baseline mentation     #Mood disorder, unspecified  PTA Zoloft    Discharge Medications with Med changes:     Current Discharge Medication List      START taking these medications    Details   aspirin (ASA) 81 MG EC tablet Take 1 tablet (81 mg) by mouth daily  Qty: 30 tablet, Refills: 3    Associated Diagnoses: ASCVD (arteriosclerotic cardiovascular disease)      cephALEXin (KEFLEX) 500 MG capsule Take 1 capsule (500 mg) by mouth every 12 hours  Qty: 19 capsule, Refills: 0    Associated Diagnoses: Acute pharyngitis, unspecified etiology      hydrALAZINE (APRESOLINE) 25 MG tablet Take 0.5 tablets (12.5 mg) by mouth 3 times daily Hold for SBP < 110  Qty: 90 tablet, Refills: 1    Associated Diagnoses: Benign essential hypertension      polyethylene glycol (MIRALAX) 17 GM/Dose powder Take 17 g by mouth daily as needed for constipation  Qty: 510 g, Refills: 0    Associated Diagnoses: Constipation, unspecified constipation type      senna-docusate (SENOKOT-S/PERICOLACE) 8.6-50 MG tablet Take 1 tablet by mouth 2 times daily Hold for loose stool  Qty: 60 tablet, Refills: 0    Associated Diagnoses: Constipation, unspecified constipation type         CONTINUE these medications which have  CHANGED    Details   losartan (COZAAR) 50 MG tablet Take 1 tablet (50 mg) by mouth 2 times daily    Associated Diagnoses: Benign essential hypertension      naproxen sodium (ANAPROX) 220 MG tablet Take 1 tablet (220 mg) by mouth 2 times daily as needed for moderate pain    Associated Diagnoses: Pain         CONTINUE these medications which have NOT CHANGED    Details   melatonin 5 MG tablet Take 5 mg by mouth At Bedtime      multivitamin w/minerals (THERA-VIT-M) tablet Take 1 tablet by mouth daily      sertraline (ZOLOFT) 50 MG tablet Take 50 mg by mouth daily         STOP taking these medications       sennosides (SENOKOT) 8.6 MG tablet Comments:   Reason for Stopping:                     Rationale for medication changes:      Hydralazine for HTN  Asa for PCA stenosis  Keflex for pharyngitis for possible strep phayrngitis        Consults     PHYSICAL THERAPY ADULT IP CONSULT  OCCUPATIONAL THERAPY ADULT IP CONSULT  CARE MANAGEMENT / SOCIAL WORK IP CONSULT    Immunizations given this encounter       There is no immunization history on file for this patient.        Anticoagulation Information          SIGNIFICANT IMAGING FINDINGS     Results for orders placed or performed during the hospital encounter of 05/27/23   CT Chest Abdomen Pelvis w/o Contrast    Impression    IMPRESSION:  1.  No evidence of acute trauma in the chest, abdomen or pelvis.  2.  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   XR Knee Bilateral 1/2 Views    Impression    IMPRESSION:   RIGHT KNEE: Moderate medial and mild patellofemoral compartment degenerative arthritis. No acute fracture. No effusion.    LEFT KNEE: Mild medial and patellofemoral compartment degenerative arthritis. No acute fracture. No effusion.   CT Head w/o Contrast    Impression    IMPRESSION:  HEAD CT:  1.  No CT evidence for acute intracranial process.  2.  Brain atrophy and presumed chronic microvascular ischemic changes as above.    CERVICAL  SPINE CT:  1.  No CT evidence for acute fracture or post traumatic subluxation.   CT Cervical Spine w/o Contrast    Impression    IMPRESSION:  HEAD CT:  1.  No CT evidence for acute intracranial process.  2.  Brain atrophy and presumed chronic microvascular ischemic changes as above.    CERVICAL SPINE CT:  1.  No CT evidence for acute fracture or post traumatic subluxation.   MR Brain w/o & w Contrast    Impression    IMPRESSION:  HEAD MRI:  1.  Motion limited examination. Postcontrast sequences are essentially nondiagnostic.   2.  No acute ischemia or mass effect.  3.  Moderate diffuse cerebral volume loss and features compatible with chronic microangiopathic ischemic white matter changes. Scattered chronic lacunar infarcts.    HEAD MRA:  1.  Motion limited exam.  2.  Suspect focal high-grade stenosis at the P1 segment of the left PCA with attenuated downstream flow related enhancement. If further imaging assessment is warranted clinically, CTA may provide additional detail.  3.  No definite aneurysm or vascular malformation.    NECK MRA:  1.  No flow-limiting stenosis or findings of dissection.       MRA Neck (Carotids) wo & w Contrast    Impression    IMPRESSION:  HEAD MRI:  1.  Motion limited examination. Postcontrast sequences are essentially nondiagnostic.   2.  No acute ischemia or mass effect.  3.  Moderate diffuse cerebral volume loss and features compatible with chronic microangiopathic ischemic white matter changes. Scattered chronic lacunar infarcts.    HEAD MRA:  1.  Motion limited exam.  2.  Suspect focal high-grade stenosis at the P1 segment of the left PCA with attenuated downstream flow related enhancement. If further imaging assessment is warranted clinically, CTA may provide additional detail.  3.  No definite aneurysm or vascular malformation.    NECK MRA:  1.  No flow-limiting stenosis or findings of dissection.       MRA Brain ([place]) wo Contrast    Impression    IMPRESSION:  HEAD  MRI:  1.  Motion limited examination. Postcontrast sequences are essentially nondiagnostic.   2.  No acute ischemia or mass effect.  3.  Moderate diffuse cerebral volume loss and features compatible with chronic microangiopathic ischemic white matter changes. Scattered chronic lacunar infarcts.    HEAD MRA:  1.  Motion limited exam.  2.  Suspect focal high-grade stenosis at the P1 segment of the left PCA with attenuated downstream flow related enhancement. If further imaging assessment is warranted clinically, CTA may provide additional detail.  3.  No definite aneurysm or vascular malformation.    NECK MRA:  1.  No flow-limiting stenosis or findings of dissection.       XR Hip Right 1 View    Impression    IMPRESSION: Normal joint spaces and alignment. No acute fracture. Diffuse bony demineralization.  Small circumscribed nodular opacity projecting over the superficial soft tissues lateral to the right ilium probably related to a cutaneous lesion.   XR Chest Port 1 View    Impression    IMPRESSION: The patient is rotated and tilted. Minor strand of linear atelectasis in the left base. Both lungs are otherwise clear. No adenopathy or effusion. Cardiac size approaches upper limits of normal with normal pulmonary vascularity. Degenerative   changes right shoulder and the thoracic spine. Reverse left shoulder. No significant interval change.         Discharge Orders        Home care nursing referral      Reason for your hospital stay    You were admitted with fever and confusion likely due to an upper respiratory infection.     Follow-up and recommended labs and tests    Follow up with primary care provider, Carlos Chaves, within 7 days for hospital follow- up.     Activity    Your activity upon discharge: activity as tolerated     Diet    Follow this diet upon discharge: Orders Placed This Encounter      Combination Diet Regular Diet Adult       Examination   Physical Exam   Temp:  [97.3  F (36.3  C)-99.1  F (37.3   C)] 97.3  F (36.3  C)  Pulse:  [] 116  Resp:  [16] 16  BP: (132-207)/() 132/68  SpO2:  [95 %-98 %] 98 %  Wt Readings from Last 1 Encounters:   05/29/23 80.2 kg (176 lb 12.9 oz)         Please see EMR for more detailed significant labs, imaging, consultant notes etc.    I, Kishor Sanabria MD, personally saw the patient today and spent greater than 30 minutes discharging this patient.    Kishor Sanarbia MD  [facility]    CC:Carlos Chaves

## 2023-05-30 NOTE — PROGRESS NOTES
[facility] told writer they may have to outsource home care PT/OT at this time, due to confusion on insurance. Writer sent home care referral for home PT/OT via [employer] so they can outsource.    CM to update pts family once home care agency found.      Sangita Chanrda RN on 5/30/2023 at 4:06 PM

## 2023-05-30 NOTE — PROGRESS NOTES
"Care Management Follow Up    Length of Stay (days): 3    Expected Discharge Date: 05/30/2023     Concerns to be Addressed:  Discharge planning    Patient plan of care discussed at interdisciplinary rounds: Yes    Anticipated Discharge Disposition:  [facility]     Anticipated Discharge Services:  [facility]    Education Provided on the Discharge Plan:  Per Care Team   Patient/Family in Agreement with the Plan:  Yes    Referrals Placed by CM/SW:    Private pay costs discussed: Not applicable    Additional Information:  Chart reviewed.     Cm updates:  Writer called [facility] this am to see if they can accommodate pt back today, awaiting call back.    They can take patient back today she would just have to be back by 2:00pm.       Social Hx:  \"SW visited pt in pt room, pt asleep. SW called pt granddalovely Barnes, she lives in [place] and pt niece Neelima lives 90 minutes away from pt. Granddaughter is planning on having pt move closer to her in [place]. Pt is able to add in more services at [facility], and granddaughter is planning on doing that until she moves pt to [place]. Per Molly. Pt has ACP documents.   JULIEN Regalado at [facility]- verified pt services and is aware of pt hospitalization.  [phone] \"      Nurse Lexi: [phone]         Cm will continue to follow plan of care, review recommendations, and assist with any discharge needs anticipated.        Sangita Chandra RN      Care Management Discharge Note    Discharge Date: 05/30/2023       Discharge Disposition: Assisted Living    Discharge Services:  [facility]    Discharge Transportation: family or friend will provide    Private pay costs discussed: Not applicable    Does the patient's insurance plan have a 3 day qualifying hospital stay waiver?  No      Education Provided on the Discharge Plan:  Per Care Team   Persons Notified of Discharge Plans: Yes  Patient/Family in Agreement with the Plan:      Handoff Referral Completed: Yes    Additional Information:  Final " discharge plan is for pt to return to [facility]. They directed writer to have pts medications sent with. Chandrakant Ortez will be staying with patient for a couple nights, and will help pt get used to new medications. [facility] accepted pt for home PT/OT.     Orders faxed to [facility].     Bedside, pt, and niece notified.             Sangita Chandra RN

## 2023-05-30 NOTE — PROGRESS NOTES
"CLINICAL NUTRITION SERVICES - ASSESSMENT NOTE     Nutrition Prescription    RECOMMENDATIONS FOR MDs/PROVIDERS TO ORDER:    Malnutrition Status:    Not noted    Recommendations already ordered by Registered Dietitian (RD):  None at this time    Future/Additional Recommendations:  Follow for LOS     REASON FOR ASSESSMENT  Molly Limon is a/an 82 year old female assessed by the dietitian for Admission Nutrition Risk Screen for positive for \"unsure\" weight loss.    No weight history to assess for weight loss. BMI 30.35. Eating % of meals per flowsheets. RD will not follow at this time.    ANTHROPOMETRICS  Height: 162.6 cm (5' 4\")  Most Recent Weight: 80.2 kg (176 lb 12.9 oz)    IBW: 54.5 kg  BMI: Overweight BMI 25-29.9  Weight History:   Wt Readings from Last 20 Encounters:   05/29/23 80.2 kg (176 lb 12.9 oz)     INTERVENTIONS  Implementation  Follow for LOS     "

## 2023-05-31 ENCOUNTER — PATIENT OUTREACH (OUTPATIENT)
Dept: CARE COORDINATION | Facility: CLINIC | Age: 83
End: 2023-05-31
Payer: COMMERCIAL

## 2023-05-31 NOTE — PROGRESS NOTES
[facility] Care Resource [place]    Background: Transitional Care Management program identified per system criteria and reviewed by [facility] Resource [place] team for possible outreach.    Assessment: Upon chart review, [facility] Team member will not proceed with patient outreach related to this episode of Transitional Care Management program due to reason below:    Patient has discharged to a Memory Care, Long-term Care, Assisted Living or Group Home where patient is receiving on-site support with their daily cares, including support with hospital follow up plan.    Plan: Transitional Care Management episode addressed appropriately per reason noted above.      MIKAYLA Moreno  Connected Care Resource [facility]    *Connected Care Resource Team does NOT follow patient ongoing. Referrals are identified based on internal discharge reports and the outreach is to ensure patient has an understanding of their discharge instructions.

## 2023-06-01 LAB
BACTERIA BLD CULT: NO GROWTH
BACTERIA SPEC CULT: NORMAL